# Patient Record
Sex: MALE | Race: WHITE | Employment: OTHER | URBAN - METROPOLITAN AREA
[De-identification: names, ages, dates, MRNs, and addresses within clinical notes are randomized per-mention and may not be internally consistent; named-entity substitution may affect disease eponyms.]

---

## 2018-03-08 ENCOUNTER — APPOINTMENT (OUTPATIENT)
Dept: GENERAL RADIOLOGY | Age: 78
DRG: 064 | End: 2018-03-08
Attending: EMERGENCY MEDICINE
Payer: MEDICARE

## 2018-03-08 ENCOUNTER — HOSPITAL ENCOUNTER (INPATIENT)
Age: 78
LOS: 2 days | Discharge: HOME HEALTH | DRG: 064 | End: 2018-03-10
Attending: EMERGENCY MEDICINE | Admitting: HOSPITALIST
Payer: MEDICARE

## 2018-03-08 ENCOUNTER — APPOINTMENT (OUTPATIENT)
Dept: MRI IMAGING | Age: 78
DRG: 064 | End: 2018-03-08
Attending: HOSPITALIST
Payer: MEDICARE

## 2018-03-08 ENCOUNTER — APPOINTMENT (OUTPATIENT)
Dept: CT IMAGING | Age: 78
DRG: 064 | End: 2018-03-08
Attending: EMERGENCY MEDICINE
Payer: MEDICARE

## 2018-03-08 DIAGNOSIS — I65.23 CAROTID STENOSIS, BILATERAL: ICD-10-CM

## 2018-03-08 DIAGNOSIS — I63.9 CEREBROVASCULAR ACCIDENT (CVA), UNSPECIFIED MECHANISM (HCC): Primary | ICD-10-CM

## 2018-03-08 DIAGNOSIS — J43.9 PULMONARY EMPHYSEMA, UNSPECIFIED EMPHYSEMA TYPE (HCC): ICD-10-CM

## 2018-03-08 PROBLEM — E78.00 PURE HYPERCHOLESTEROLEMIA: Status: ACTIVE | Noted: 2018-03-08

## 2018-03-08 PROBLEM — I10 ESSENTIAL HYPERTENSION: Status: ACTIVE | Noted: 2018-03-08

## 2018-03-08 LAB
ANION GAP SERPL CALC-SCNC: 4 MMOL/L (ref 7–16)
ATRIAL RATE: 78 BPM
BASOPHILS # BLD: 0 K/UL (ref 0–0.2)
BASOPHILS NFR BLD: 0 % (ref 0–2)
BUN SERPL-MCNC: 19 MG/DL (ref 8–23)
CALCIUM SERPL-MCNC: 9.2 MG/DL (ref 8.3–10.4)
CALCULATED P AXIS, ECG09: 74 DEGREES
CALCULATED R AXIS, ECG10: 85 DEGREES
CALCULATED T AXIS, ECG11: 81 DEGREES
CHLORIDE SERPL-SCNC: 103 MMOL/L (ref 98–107)
CO2 SERPL-SCNC: 32 MMOL/L (ref 21–32)
CREAT SERPL-MCNC: 1.38 MG/DL (ref 0.8–1.5)
DIAGNOSIS, 93000: NORMAL
DIFFERENTIAL METHOD BLD: ABNORMAL
EOSINOPHIL # BLD: 0.1 K/UL (ref 0–0.8)
EOSINOPHIL NFR BLD: 2 % (ref 0.5–7.8)
ERYTHROCYTE [DISTWIDTH] IN BLOOD BY AUTOMATED COUNT: 22.9 % (ref 11.9–14.6)
GLUCOSE BLD STRIP.AUTO-MCNC: 103 MG/DL (ref 65–100)
GLUCOSE SERPL-MCNC: 105 MG/DL (ref 65–100)
HCT VFR BLD AUTO: 41.5 % (ref 41.1–50.3)
HGB BLD-MCNC: 13.4 G/DL (ref 13.6–17.2)
IMM GRANULOCYTES # BLD: 0 K/UL (ref 0–0.5)
IMM GRANULOCYTES NFR BLD AUTO: 0 % (ref 0–5)
INR BLD: 1 (ref 0.9–1.2)
LYMPHOCYTES # BLD: 1 K/UL (ref 0.5–4.6)
LYMPHOCYTES NFR BLD: 15 % (ref 13–44)
MCH RBC QN AUTO: 27.8 PG (ref 26.1–32.9)
MCHC RBC AUTO-ENTMCNC: 32.3 G/DL (ref 31.4–35)
MCV RBC AUTO: 86.1 FL (ref 79.6–97.8)
MONOCYTES # BLD: 0.6 K/UL (ref 0.1–1.3)
MONOCYTES NFR BLD: 8 % (ref 4–12)
NEUTS SEG # BLD: 5.2 K/UL (ref 1.7–8.2)
NEUTS SEG NFR BLD: 75 % (ref 43–78)
P-R INTERVAL, ECG05: 134 MS
PLATELET # BLD AUTO: 242 K/UL (ref 150–450)
PMV BLD AUTO: 9.3 FL (ref 10.8–14.1)
POTASSIUM SERPL-SCNC: 4.7 MMOL/L (ref 3.5–5.1)
PT BLD: 12.6 SECS (ref 9.6–11.6)
Q-T INTERVAL, ECG07: 360 MS
QRS DURATION, ECG06: 84 MS
QTC CALCULATION (BEZET), ECG08: 410 MS
RBC # BLD AUTO: 4.82 M/UL (ref 4.23–5.67)
SODIUM SERPL-SCNC: 139 MMOL/L (ref 136–145)
TSH SERPL DL<=0.005 MIU/L-ACNC: 1.07 UIU/ML (ref 0.36–3.74)
VENTRICULAR RATE, ECG03: 78 BPM
WBC # BLD AUTO: 6.9 K/UL (ref 4.3–11.1)

## 2018-03-08 PROCEDURE — 94762 N-INVAS EAR/PLS OXIMTRY CONT: CPT | Performed by: EMERGENCY MEDICINE

## 2018-03-08 PROCEDURE — 82962 GLUCOSE BLOOD TEST: CPT

## 2018-03-08 PROCEDURE — 71045 X-RAY EXAM CHEST 1 VIEW: CPT

## 2018-03-08 PROCEDURE — 74011000250 HC RX REV CODE- 250: Performed by: HOSPITALIST

## 2018-03-08 PROCEDURE — 94760 N-INVAS EAR/PLS OXIMETRY 1: CPT

## 2018-03-08 PROCEDURE — 74011000302 HC RX REV CODE- 302: Performed by: HOSPITALIST

## 2018-03-08 PROCEDURE — 74011000250 HC RX REV CODE- 250: Performed by: EMERGENCY MEDICINE

## 2018-03-08 PROCEDURE — 70551 MRI BRAIN STEM W/O DYE: CPT

## 2018-03-08 PROCEDURE — 84443 ASSAY THYROID STIM HORMONE: CPT | Performed by: HOSPITALIST

## 2018-03-08 PROCEDURE — 74011250636 HC RX REV CODE- 250/636: Performed by: HOSPITALIST

## 2018-03-08 PROCEDURE — 80048 BASIC METABOLIC PNL TOTAL CA: CPT | Performed by: EMERGENCY MEDICINE

## 2018-03-08 PROCEDURE — 94640 AIRWAY INHALATION TREATMENT: CPT

## 2018-03-08 PROCEDURE — 65660000000 HC RM CCU STEPDOWN

## 2018-03-08 PROCEDURE — 93005 ELECTROCARDIOGRAM TRACING: CPT | Performed by: EMERGENCY MEDICINE

## 2018-03-08 PROCEDURE — 74011250637 HC RX REV CODE- 250/637: Performed by: EMERGENCY MEDICINE

## 2018-03-08 PROCEDURE — 74011636320 HC RX REV CODE- 636/320: Performed by: EMERGENCY MEDICINE

## 2018-03-08 PROCEDURE — 85610 PROTHROMBIN TIME: CPT

## 2018-03-08 PROCEDURE — 74011250636 HC RX REV CODE- 250/636: Performed by: EMERGENCY MEDICINE

## 2018-03-08 PROCEDURE — 74011000258 HC RX REV CODE- 258: Performed by: EMERGENCY MEDICINE

## 2018-03-08 PROCEDURE — 70450 CT HEAD/BRAIN W/O DYE: CPT

## 2018-03-08 PROCEDURE — 74011250637 HC RX REV CODE- 250/637: Performed by: HOSPITALIST

## 2018-03-08 PROCEDURE — 92610 EVALUATE SWALLOWING FUNCTION: CPT

## 2018-03-08 PROCEDURE — 86580 TB INTRADERMAL TEST: CPT | Performed by: HOSPITALIST

## 2018-03-08 PROCEDURE — 70498 CT ANGIOGRAPHY NECK: CPT

## 2018-03-08 PROCEDURE — 85025 COMPLETE CBC W/AUTO DIFF WBC: CPT | Performed by: EMERGENCY MEDICINE

## 2018-03-08 PROCEDURE — 99285 EMERGENCY DEPT VISIT HI MDM: CPT | Performed by: EMERGENCY MEDICINE

## 2018-03-08 RX ORDER — GUAIFENESIN 100 MG/5ML
324 LIQUID (ML) ORAL
Status: DISCONTINUED | OUTPATIENT
Start: 2018-03-08 | End: 2018-03-08

## 2018-03-08 RX ORDER — SODIUM CHLORIDE 9 MG/ML
100 INJECTION, SOLUTION INTRAVENOUS CONTINUOUS
Status: DISPENSED | OUTPATIENT
Start: 2018-03-08 | End: 2018-03-09

## 2018-03-08 RX ORDER — PRAVASTATIN SODIUM 20 MG/1
40 TABLET ORAL
Status: DISCONTINUED | OUTPATIENT
Start: 2018-03-08 | End: 2018-03-08 | Stop reason: DRUGHIGH

## 2018-03-08 RX ORDER — LABETALOL HYDROCHLORIDE 5 MG/ML
5 INJECTION, SOLUTION INTRAVENOUS
Status: DISCONTINUED | OUTPATIENT
Start: 2018-03-08 | End: 2018-03-10 | Stop reason: HOSPADM

## 2018-03-08 RX ORDER — GUAIFENESIN 100 MG/5ML
324 LIQUID (ML) ORAL
Status: COMPLETED | OUTPATIENT
Start: 2018-03-08 | End: 2018-03-08

## 2018-03-08 RX ORDER — ASPIRIN 300 MG/1
300 SUPPOSITORY RECTAL ONCE
Status: DISCONTINUED | OUTPATIENT
Start: 2018-03-08 | End: 2018-03-08

## 2018-03-08 RX ORDER — FACIAL-BODY WIPES
10 EACH TOPICAL DAILY PRN
Status: DISCONTINUED | OUTPATIENT
Start: 2018-03-08 | End: 2018-03-10 | Stop reason: HOSPADM

## 2018-03-08 RX ORDER — ALBUTEROL SULFATE 0.83 MG/ML
2.5 SOLUTION RESPIRATORY (INHALATION)
Status: DISCONTINUED | OUTPATIENT
Start: 2018-03-08 | End: 2018-03-10 | Stop reason: HOSPADM

## 2018-03-08 RX ORDER — SODIUM CHLORIDE 0.9 % (FLUSH) 0.9 %
5-10 SYRINGE (ML) INJECTION EVERY 8 HOURS
Status: DISCONTINUED | OUTPATIENT
Start: 2018-03-08 | End: 2018-03-10 | Stop reason: HOSPADM

## 2018-03-08 RX ORDER — ENOXAPARIN SODIUM 100 MG/ML
40 INJECTION SUBCUTANEOUS EVERY 24 HOURS
Status: DISCONTINUED | OUTPATIENT
Start: 2018-03-08 | End: 2018-03-08

## 2018-03-08 RX ORDER — BUDESONIDE 0.5 MG/2ML
500 INHALANT ORAL
Status: DISCONTINUED | OUTPATIENT
Start: 2018-03-08 | End: 2018-03-10 | Stop reason: HOSPADM

## 2018-03-08 RX ORDER — OMEPRAZOLE 40 MG/1
40 CAPSULE, DELAYED RELEASE ORAL DAILY
COMMUNITY

## 2018-03-08 RX ORDER — SODIUM CHLORIDE 0.9 % (FLUSH) 0.9 %
5-10 SYRINGE (ML) INJECTION AS NEEDED
Status: DISCONTINUED | OUTPATIENT
Start: 2018-03-08 | End: 2018-03-10 | Stop reason: HOSPADM

## 2018-03-08 RX ORDER — ARFORMOTEROL TARTRATE 15 UG/2ML
15 SOLUTION RESPIRATORY (INHALATION)
Status: DISCONTINUED | OUTPATIENT
Start: 2018-03-08 | End: 2018-03-10 | Stop reason: HOSPADM

## 2018-03-08 RX ORDER — SODIUM CHLORIDE 0.9 % (FLUSH) 0.9 %
10 SYRINGE (ML) INJECTION
Status: COMPLETED | OUTPATIENT
Start: 2018-03-08 | End: 2018-03-08

## 2018-03-08 RX ORDER — ACETAMINOPHEN 325 MG/1
650 TABLET ORAL
Status: DISCONTINUED | OUTPATIENT
Start: 2018-03-08 | End: 2018-03-10 | Stop reason: HOSPADM

## 2018-03-08 RX ORDER — IPRATROPIUM BROMIDE AND ALBUTEROL SULFATE 2.5; .5 MG/3ML; MG/3ML
3 SOLUTION RESPIRATORY (INHALATION)
Status: COMPLETED | OUTPATIENT
Start: 2018-03-08 | End: 2018-03-08

## 2018-03-08 RX ORDER — LANOLIN ALCOHOL/MO/W.PET/CERES
325 CREAM (GRAM) TOPICAL DAILY
COMMUNITY

## 2018-03-08 RX ORDER — CLOPIDOGREL BISULFATE 75 MG/1
75 TABLET ORAL DAILY
Status: DISCONTINUED | OUTPATIENT
Start: 2018-03-09 | End: 2018-03-10

## 2018-03-08 RX ORDER — ENOXAPARIN SODIUM 100 MG/ML
30 INJECTION SUBCUTANEOUS EVERY 24 HOURS
Status: DISCONTINUED | OUTPATIENT
Start: 2018-03-08 | End: 2018-03-10

## 2018-03-08 RX ORDER — PRAVASTATIN SODIUM 80 MG/1
80 TABLET ORAL
Status: DISCONTINUED | OUTPATIENT
Start: 2018-03-08 | End: 2018-03-10 | Stop reason: HOSPADM

## 2018-03-08 RX ORDER — DILTIAZEM HYDROCHLORIDE 120 MG/1
120 CAPSULE, COATED, EXTENDED RELEASE ORAL DAILY
Status: DISCONTINUED | OUTPATIENT
Start: 2018-03-09 | End: 2018-03-10 | Stop reason: HOSPADM

## 2018-03-08 RX ADMIN — SODIUM CHLORIDE 100 ML/HR: 900 INJECTION, SOLUTION INTRAVENOUS at 17:06

## 2018-03-08 RX ADMIN — PRAVASTATIN SODIUM 80 MG: 80 TABLET ORAL at 21:17

## 2018-03-08 RX ADMIN — TUBERCULIN PURIFIED PROTEIN DERIVATIVE 5 UNITS: 5 INJECTION, SOLUTION INTRADERMAL at 21:30

## 2018-03-08 RX ADMIN — IPRATROPIUM BROMIDE AND ALBUTEROL SULFATE 3 ML: .5; 3 SOLUTION RESPIRATORY (INHALATION) at 13:22

## 2018-03-08 RX ADMIN — ENOXAPARIN SODIUM 40 MG: 40 INJECTION SUBCUTANEOUS at 17:05

## 2018-03-08 RX ADMIN — Medication 5 ML: at 21:18

## 2018-03-08 RX ADMIN — ASPIRIN 81 MG 324 MG: 81 TABLET ORAL at 14:45

## 2018-03-08 RX ADMIN — ARFORMOTEROL TARTRATE 15 MCG: 15 SOLUTION RESPIRATORY (INHALATION) at 22:04

## 2018-03-08 RX ADMIN — Medication 10 ML: at 12:57

## 2018-03-08 RX ADMIN — SODIUM CHLORIDE 100 ML: 900 INJECTION, SOLUTION INTRAVENOUS at 12:57

## 2018-03-08 RX ADMIN — IOPAMIDOL: 755 INJECTION, SOLUTION INTRAVENOUS at 12:57

## 2018-03-08 RX ADMIN — BUDESONIDE 500 MCG: 0.5 INHALANT RESPIRATORY (INHALATION) at 22:04

## 2018-03-08 NOTE — ED NOTES
Aspirin oral changed to ASA suppository as pt failed bedside swallow and will need evaluate from speech.

## 2018-03-08 NOTE — IP AVS SNAPSHOT
[facility] 
 
 
 [address] 
[phone] Patient: Jaja Henry MRN: [id number] AFS:8/9/[birth year]00 About your hospitalization You were admitted on:  March 8, 2018 You last received care in the:  [facility] 7 MED SURG You were discharged on:  March 10, 2018 Why you were hospitalized Your primary diagnosis was:  Cva (Cerebral Vascular Accident) (Hcc) Your diagnoses also included:  Carotid Stenosis, Bilateral, Essential Hypertension, Pure Hypercholesterolemia, Pulmonary Emphysema (Hcc), Aspiration Pneumonitis (Hcc), Paroxysmal Atrial Tachycardia (Hcc) Follow-up Information Follow up With Details Comments Contact Info Karlos De La Rosa MD In 3 days  [address] [address] 
[phone] Discharge Orders None A check dominguez indicates which time of day the medication should be taken. My Medications START taking these medications Instructions Each Dose to Equal  
 Morning Noon Evening Bedtime  
 apixaban 5 mg tablet Commonly known as:  Ottoniel Becket Take 1 Tab by mouth two (2) times a day for 30 days. 5 mg  
    
  
   
   
   
  
  
 fluticasone-salmeterol 250-50 mcg/dose diskus inhaler Commonly known as:  ADVAIR Take 1 Puff by inhalation two (2) times a day. 1 Puff  
    
  
   
   
  
   
  
 tiotropium 18 mcg inhalation capsule Commonly known as:  Kaylyn Ear Start taking on:  3/11/2018 Take 1 Cap by inhalation daily. 1 Cap CONTINUE taking these medications Instructions Each Dose to Equal  
 Morning Noon Evening Bedtime  
 dilTIAZem  mg ER capsule Commonly known as:  CARDIZEM CD Take 1 Cap by mouth daily. 120 mg  
    
  
   
   
   
  
 ferrous sulfate 325 mg (65 mg iron) tablet Take 325 mg by mouth daily. 325 mg  
    
  
   
   
   
  
 omeprazole 40 mg capsule Commonly known as:  PRILOSEC Take 40 mg by mouth daily. 40 mg  
    
  
   
   
   
  
 pravastatin 40 mg tablet Commonly known as:  PRAVACHOL Take 1 Tab by mouth nightly. 40 mg  
    
   
   
   
  
  
  
STOP taking these medications   
 aspirin 81 mg chewable tablet Where to Get Your Medications Information on where to get these meds will be given to you by the nurse or doctor. ! Ask your nurse or doctor about these medications  
  apixaban 5 mg tablet  
 fluticasone-salmeterol 250-50 mcg/dose diskus inhaler  
 tiotropium 18 mcg inhalation capsule Discharge Instructions NUTRITION Continue Oral Nutrition Supplement (ONS) at discharge. Recommend Ensure Enlive or a comparable/similar product Three times daily for 30 days unless otherwise directed by your Primary Care Physician. Kalani Bonds RD, LD Stroke: After Your Visit Your Care Instructions You have had a stroke. Risk factors for stroke include being overweight, smoking, and sedentary lifestyle. This means that the blood flow to a part of your brain was blocked for some time, which damages the nerve cells in that part of the brain. The part of your body controlled by that part of your brain may not function properly now. The brain is an amazing organ that can heal itself to some degree. The stroke you had damaged part of your brain, but other parts of your brain may take over in some way for the damaged areas. You have already started this process. Going home may be hard for you and your family. The more you can try to do for yourself, the better. Remember to take each day one at a time. Follow-up care is a key part of your treatment and safety. Be sure to make and go to all appointments, and call your doctor if you are having problems. Its also a good idea to know your test results and keep a list of the medicines you take. How can you care for yourself at home? Enter a stroke rehabilitation (rehab) program, if your doctor recommends it. Physical, speech, and occupational therapies can help you manage bathing, dressing, eating, and other basics of daily living. Eat a heart-healthy diet that is low in cholesterol, saturated fat, and salt. Eat lots of fresh fruits and vegetables and foods high in fiber. Increase your activities slowly. Take short rest breaks when you get tired. Gradually increase the amount you walk. Start out by walking a little more than you did the day before. Do not drive until your doctor says it is okay. It is normal to feel sad or depressed after a stroke. If the blues last, talk to your doctor. If you are having problems with urine leakage, go to the bathroom at regular times, including when you first wake up and at bedtime. Also, limit fluids after dinner. If you are constipated, drink plenty of fluids, enough so that your urine is light yellow or clear like water. If you have kidney, heart, or liver disease and have to limit fluids, talk with your doctor before you increase the amount of fluids you drink. Set up a regular time for using the toilet. If you continue to have constipation, your doctor may suggest using a bulking agent, such as Metamucil, or a stool softener, laxative, or enema. Medicines Take your medicines exactly as prescribed. Call your doctor if you think you are having a problem with your medicine. You may be taking several medicines. ACE (angiotensin-converting enzyme) inhibitors, angiotensin II receptor blockers (ARBs), beta-blockers, diuretics (water pills), and calcium channel blockers control your blood pressure. Statins help lower cholesterol. Your doctor may also prescribe medicines for depression, pain, sleep problems, anxiety, or agitation.  
If your doctor has given you medicine that prevents blood clots, such as warfarin (Coumadin), aspirin combined with extended-release dipyridamole (Aggrenox), clopidogrel (Plavix), or aspirin to prevent another stroke, you should: 
Tell your dentist, pharmacist, and other health professionals that you take these medicines. Watch for unusual bruising or bleeding, such as blood in your urine, red or black stools, or bleeding from your nose or gums. Get regular blood tests to check your clotting time if you are taking Coumadin. Wear medical alert jewelry that says you take blood thinners. You can buy this at most [employer]. Do not take any over-the-counter medicines or herbal products without talking to your doctor first. 
If you take birth control pills or hormone replacement therapy, talk to your doctor about whether they are right for you. For family members and caregivers Make the home safe. Set up a room so that your loved one does not have to climb stairs. Be sure the bathroom is on the same floor. Move throw rugs and furniture that could cause falls, and make sure that the lighting is good. Put grab bars and seats in tubs and showers. Find out what your loved one can do and what he or she needs help with. Try not to do things for your loved one that your loved one can do on his or her own. Help him or her learn and practice new skills. Visit and talk with your loved one often. Try doing activities together that you both enjoy, such as playing cards or board games. Keep in touch with your loved one's friends as much as you can, and encourage them to visit. Take care of yourself. Do not try to do everything yourself. Ask other family members to help. Eat well, get enough rest, and take time to do things that you enjoy. Keep up with your own doctor visits, and make sure to take your medicines regularly. Get out of the house as much as you can. Join a local support group. Find out if you qualify for home health care visits to help with rehab or for adult day care. When should you call for help? Call 911 anytime you think you may need emergency care. For example, call if: 
You have signs of another stroke. These may include: 
Sudden numbness, paralysis, or weakness in your face, arm, or leg, especially on only one side of your body. New problems with walking or balance. Sudden vision changes. Drooling or slurred speech. New problems speaking or understanding simple statements, or you feel confused. A sudden, severe headache that is different from past headaches. Call 911 even if these symptoms go away in a few minutes. You cough up blood. You vomit blood or what looks like coffee grounds. You pass maroon or very bloody stools. Call your doctor now or seek immediate medical care if: 
You have new bruises or blood spots under your skin. You have a nosebleed. Your gums bleed when you brush your teeth. You have blood in your urine. Your stools are black and tarlike or have streaks of blood. You have vaginal bleeding when you are not having your period, or heavy period bleeding. You have new symptoms that may be related to your stroke, such as falls or trouble swallowing. Watch closely for changes in your health, and be sure to contact your doctor if you have any problems. Where can you learn more? Go to [employer].be Enter D959  in the search box to learn more about \"Stroke: After Your Visit\". © 4843-7844 Healthwise, Incorporated. Care instructions adapted under license by [facility] (which disclaims liability or warranty for this information). This care instruction is for use with your licensed healthcare professional. If you have questions about a medical condition or this instruction, always ask your healthcare professional. Oleg Fernando any warranty or liability for your use of this information. DISCHARGE SUMMARY from Nurse PATIENT INSTRUCTIONS: 
 
 After general anesthesia or intravenous sedation, for 24 hours or while taking prescription Narcotics: · Limit your activities · Do not drive and operate hazardous machinery · Do not make important personal or business decisions · Do  not drink alcoholic beverages · If you have not urinated within 8 hours after discharge, please contact your surgeon on call. Report the following to your surgeon: 
· Excessive pain, swelling, redness or odor of or around the surgical area · Temperature over 100.5 · Nausea and vomiting lasting longer than 4 hours or if unable to take medications · Any signs of decreased circulation or nerve impairment to extremity: change in color, persistent  numbness, tingling, coldness or increase pain · Any questions What to do at Home: 
Recommended activity: Activity as tolerated, per MD instructions If you experience any of the following symptoms fever > 100.5, nausea, vomiting, pain, chest pain and/or shortness of breath please follow up with MD. 
 
*  Please give a list of your current medications to your Primary Care Provider. *  Please update this list whenever your medications are discontinued, doses are 
    changed, or new medications (including over-the-counter products) are added. *  Please carry medication information at all times in case of emergency situations. These are general instructions for a healthy lifestyle: No smoking/ No tobacco products/ Avoid exposure to second hand smoke Surgeon General's Warning:  Quitting smoking now greatly reduces serious risk to your health. Obesity, smoking, and sedentary lifestyle greatly increases your risk for illness A healthy diet, regular physical exercise & weight monitoring are important for maintaining a healthy lifestyle You may be retaining fluid if you have a history of heart failure or if you experience any of the following symptoms:  Weight gain of 3 pounds or more overnight or 5 pounds in a week, increased swelling in our hands or feet or shortness of breath while lying flat in bed. Please call your doctor as soon as you notice any of these symptoms; do not wait until your next office visit. Recognize signs and symptoms of STROKE: 
 
F-face looks uneven A-arms unable to move or move unevenly S-speech slurred or non-existent T-time-call 911 as soon as signs and symptoms begin-DO NOT go Back to bed or wait to see if you get better-TIME IS BRAIN. Warning Signs of HEART ATTACK Call 911 if you have these symptoms: 
? Chest discomfort. Most heart attacks involve discomfort in the center of the chest that lasts more than a few minutes, or that goes away and comes back. It can feel like uncomfortable pressure, squeezing, fullness, or pain. ? Discomfort in other areas of the upper body. Symptoms can include pain or discomfort in one or both arms, the back, neck, jaw, or stomach. ? Shortness of breath with or without chest discomfort. ? Other signs may include breaking out in a cold sweat, nausea, or lightheadedness. Don't wait more than five minutes to call [address]! Fast action can save your life. Calling 911 is almost always the fastest way to get lifesaving treatment. Emergency Medical Services staff can begin treatment when they arrive  up to an hour sooner than if someone gets to the hospital by car. The discharge information has been reviewed with the patient. The patient verbalized understanding. Discharge medications reviewed with the patient and appropriate educational materials and side effects teaching were provided. ___________________________________________________________________________________________________________________________________ MyChart Announcement  We are excited to announce that we are making your provider's discharge notes available to you in [employer]. You will see these notes when they are completed and signed by the physician that discharged you from your recent hospital stay. If you have any questions or concerns about any information you see in [employer], please call the Health Information Department where you were seen or reach out to your Primary Care Provider for more information about your plan of care. Introducing [facility]! [facility] [facility] introduces [employer] patient portal. Now you can access parts of your medical record, email your doctor's office, and request medication refills online. 1. In your internet browser, go to [email or web address] 2. Click on the First Time User? Click Here link in the Sign In box. You will see the New Member Sign Up page. 3. Enter your [employer] Access Code exactly as it appears below. You will not need to use this code after youve completed the sign-up process. If you do not sign up before the expiration date, you must request a new code. · [employer] Access Code: [email or web address] Expires: 6/6/2018  2:26 PM 
 
4. Enter the last four digits of your Social Security Number (xxxx) and Date of Birth (mm/dd/yyyy) as indicated and click Submit. You will be taken to the next sign-up page. 5. Create a [employer] ID. This will be your [employer] login ID and cannot be changed, so think of one that is secure and easy to remember. 6. Create a [employer] password. You can change your password at any time. 7. Enter your Password Reset Question and Answer. This can be used at a later time if you forget your password. 8. Enter your e-mail address. You will receive e-mail notification when new information is available in [address] 9. Click Sign Up. You can now view and download portions of your medical record. 10. Click the Download Summary menu link to download a portable copy of your medical information. If you have questions, please visit the Frequently Asked Questions section of the [employer] website. Remember, [employer] is NOT to be used for urgent needs. For medical emergencies, dial 911. Now available from your iPhone and Android! Providers Seen During Your Hospitalization Provider Specialty Primary office phone Ramirez Curtis MD Emergency Medicine [phone] Ella Olson MD Internal Medicine [phone] Immunizations Administered for This Admission Name Date Influenza Vaccine (Quad) PF  Deferred () TB Skin Test (PPD) Intradermal  Deferred (), 3/8/2018 Your Primary Care Physician (PCP) Primary Care Physician Office Phone Office Fax Honorio Evans  You are allergic to the following No active allergies Recent Documentation Weight BMI Smoking Status 47.2 kg 15.81 kg/m2 Former Smoker Emergency Contacts Name Discharge Info Relation Home Work Mobile René Garsia [22] [phone] Patient Belongings The following personal items are in your possession at time of discharge: 
  Dental Appliances: Uppers, With patient  Visual Aid: Glasses      Home Medications: None   Jewelry: None  Clothing: Pants, Shirt, Undergarments, With patient    Other Valuables: Wallet  Personal Items Sent to Safe: none Please provide this summary of care documentation to your next provider. Signatures-by signing, you are acknowledging that this After Visit Summary has been reviewed with you and you have received a copy. Patient Signature:  ____________________________________________________________ Date:  ____________________________________________________________  
  
Marisa Villavicencio Provider Signature:  ____________________________________________________________ Date:  ____________________________________________________________

## 2018-03-08 NOTE — ED TRIAGE NOTES
Pt arrives per EMS from home. Upon waking this AM found to be having left sided weakness and numbness.  Stroke called on arrival .

## 2018-03-08 NOTE — PROGRESS NOTES
TRANSFER - IN REPORT:    Verbal report received from Jv Davis RN on Bib Hartman  being received from ER for routine progression of care      Report consisted of patients Situation, Background, Assessment and   Recommendations(SBAR). Information from the following report(s) SBAR, ED Summary, Intake/Output, MAR, Accordion and Recent Results was reviewed with the receiving nurse. Opportunity for questions and clarification was provided. Assessment completed upon patients arrival to unit and care assumed.

## 2018-03-08 NOTE — ED PROVIDER NOTES
HPI Comments: 59-year-old male went to bed around midnight last night. He woke up at 10 AM to use the bathroom and was falling to the left. He denies getting up throughout the night. He was found by his son in the bathroom and required assistance to get up. Upon EMS arrival, he had left-sided weakness. He also reports left sided numbness. No history of stroke. Denies headache. Also reports pain in his left eye. Has history of diabetes, hypertension, COPD. Patient is a 68 y.o. male presenting with weakness. The history is provided by the patient. Extremity Weakness    Associated symptoms include numbness. Pertinent negatives include no back pain. Past Medical History:   Diagnosis Date    Chronic obstructive pulmonary disease ([facility] [address]     Hypertension        History reviewed. No pertinent surgical history. History reviewed. No pertinent family history. Social History     Social History    Marital status:      Spouse name: N/A    Number of children: N/A    Years of education: N/A     Occupational History    Not on file. Social History Main Topics    Smoking status: Former Smoker    Smokeless tobacco: Not on file    Alcohol use No    Drug use: No    Sexual activity: Not on file     Other Topics Concern    Not on file     Social History Narrative         ALLERGIES: Review of patient's allergies indicates no known allergies. Review of Systems   Constitutional: Negative for chills and fever. HENT: Negative for hearing loss. Eyes: Negative for visual disturbance. Respiratory: Positive for shortness of breath. Negative for cough. Cardiovascular: Negative for chest pain and palpitations. Gastrointestinal: Negative for abdominal pain, diarrhea, nausea and vomiting. Musculoskeletal: Negative for back pain. Skin: Negative for rash. Neurological: Positive for weakness and numbness. Negative for headaches. Psychiatric/Behavioral: Negative for confusion. Vitals:    03/08/18 1211 03/08/18 1214 03/08/18 1217   BP: 155/88  154/83   Pulse: 89  83   Resp: 20  26   Temp: 97.7 °F (36.5 °C)     SpO2: (!) 89% 97% 97%   Weight: 47.2 kg (104 lb)              Physical Exam   Constitutional: He appears well-developed and well-nourished. HENT:   Head: Normocephalic and atraumatic. Right Ear: External ear normal.   Left Ear: External ear normal.   Nose: Nose normal.   Mouth/Throat: Oropharynx is clear and moist.   Eyes: Conjunctivae are normal. Pupils are equal, round, and reactive to light. Neck: Normal range of motion. Neck supple. Cardiovascular: Regular rhythm and intact distal pulses. Exam reveals distant heart sounds. Pulmonary/Chest: Effort normal and breath sounds normal. Tachypnea noted. No respiratory distress. He has no wheezes. Abdominal: Soft. Bowel sounds are normal. He exhibits no distension. There is no tenderness. Musculoskeletal: Normal range of motion. He exhibits no edema. Neurological: He is alert. A sensory deficit is present. He exhibits abnormal muscle tone. Gait abnormal.   Decreased sensation left face, left arm, left leg. Slight weakness left arm and leg. (4/5) No obvious facial droop. Slight tongue deviation to the left   Skin: Skin is warm and dry. Psychiatric: Judgment normal.   Nursing note and vitals reviewed. MDM  Number of Diagnoses or Management Options  Cerebrovascular accident (CVA), unspecified mechanism [facility]):   Diagnosis management comments: Parts of this document were created using dragon voice recognition software. The chart has been reviewed but errors may still be present. Wake up Stroke with normal initial head CT. Evaluated by tele neurology. We'll obtain CTA and admission for MRI.    2:24 PM  Carotid stenosis on CTA, otherwise unremarkable. Discussed with hospitalist for admission. Patient updated.        Amount and/or Complexity of Data Reviewed  Clinical lab tests: ordered and reviewed (Results for orders placed or performed during the hospital encounter of 03/08/18  -CBC WITH AUTOMATED DIFF       Result                                            Value                         Ref Range                       WBC                                               6.9                           4.3 - 11.1 K/uL                 RBC                                               4.82                          4.23 - 5.67 M/uL                HGB                                               13.4 (L)                      13.6 - 17.2 g/dL                HCT                                               41.5                          41.1 - 50.3 %                   MCV                                               86.1                          79.6 - 97.8 FL                  MCH                                               27.8                          26.1 - 32.9 PG                  MCHC                                              32.3                          31.4 - 35.0 g/dL                RDW                                               22.9 (H)                      11.9 - 14.6 %                   PLATELET                                          242                           150 - 450 K/uL                  MPV                                               9.3 (L)                       10.8 - 14.1 FL                  DF                                                AUTOMATED                                                     NEUTROPHILS                                       75                            43 - 78 %                       LYMPHOCYTES                                       15                            13 - 44 %                       MONOCYTES                                         8                             4.0 - 12.0 %                    EOSINOPHILS                                       2                             0.5 - 7.8 %                     BASOPHILS 0                             0.0 - 2.0 %                     IMMATURE GRANULOCYTES                             0                             0.0 - 5.0 %                     ABS. NEUTROPHILS                                  5.2                           1.7 - 8.2 K/UL                  ABS. LYMPHOCYTES                                  1.0                           0.5 - 4.6 K/UL                  ABS. MONOCYTES                                    0.6                           0.1 - 1.3 K/UL                  ABS. EOSINOPHILS                                  0.1                           0.0 - 0.8 K/UL                  ABS. BASOPHILS                                    0.0                           0.0 - 0.2 K/UL                  ABS. IMM.  GRANS.                                  0.0                           0.0 - 0.5 K/UL             -METABOLIC PANEL, BASIC       Result                                            Value                         Ref Range                       Sodium                                            139                           136 - 145 mmol/L                Potassium                                         4.7                           3.5 - 5.1 mmol/L                Chloride                                          103                           98 - 107 mmol/L                 CO2                                               32                            21 - 32 mmol/L                  Anion gap                                         4 (L)                         7 - 16 mmol/L                   Glucose                                           105 (H)                       65 - 100 mg/dL                  BUN                                               19                            8 - 23 MG/DL                    Creatinine                                        1.38                          0.8 - 1.5 MG/DL                 GFR est AA                                        >60 >60 ml/min/1.73m2               GFR est non-AA                                    53 (L)                        >60 ml/min/1.73m2               Calcium                                           9.2                           8.3 - 10.4 MG/DL           -POC PT/INR       Result                                            Value                         Ref Range                       Prothrombin time (POC)                            12.6 (H)                      9.6 - 11.6 SECS                 INR (POC)                                         1.0                           0.9 - 1.2                  -GLUCOSE, POC       Result                                            Value                         Ref Range                       Glucose (POC)                                     103 (H)                       65 - 100 mg/dL             -EKG, 12 LEAD, INITIAL       Result                                            Value                         Ref Range                       Ventricular Rate                                  78                            BPM                             Atrial Rate                                       78                            BPM                             P-R Interval                                      134                           ms                              QRS Duration                                      84                            ms                              Q-T Interval                                      360                           ms                              QTC Calculation (Bezet)                           410                           ms                              Calculated P Axis                                 74                            degrees                         Calculated R Axis                                 85                            degrees                         Calculated T Axis                                 81 degrees                         Diagnosis                                                                                                   !! AGE AND GENDER SPECIFIC ECG ANALYSIS !! Normal sinus rhythm   Septal infarct , age undetermined   T wave abnormality, consider anterior ischemia   Abnormal ECG   When compared with ECG of 20-JUN-2016 16:38,   Premature supraventricular complexes are no longer Present   T wave inversion now evident in Anterior leads   Confirmed by BRADLEY EDOUARD (), Arleen Barrientos ([id number]) on 3/8/2018 2:06:06 PM     )  Tests in the radiology section of CPT®: ordered and reviewed (Ct Head Wo Cont    Result Date: 3/8/2018  CT HEAD WITHOUT CONTRAST, 3/8/2018 History: Left-sided weakness. Comparison: CT head without contrast 6/20/2016 Technique:   5 mm axial scans from the skull base to the vertex. All CT scans performed at this facility use one or all of the following: Automated exposure control, adjustment of the mA and/or kVp according to patient's size, iterative reconstruction. Findings:  No evidence of intracranial hemorrhage is seen. No abnormal extra-axial fluid collections are seen. Stable mild to moderate cortical involutional changes are seen. No evidence for acute hydrocephalus is seen. No evidence of midline shift or herniation is seen. No abnormal edema pattern is seen in a vascular distribution to suggest large artery infarction. Only stable chronic periventricular white matter hypoattenuation is seen. Evaluation with bone windows shows no acute osseous changes. The visualized sinuses, middle ears, and mastoid air cells are well aerated. IMPRESSION:  1. No acute intracranial process evident by noncontrast CT study of the head. Cta Head Neck W Wo Cont    Result Date: 3/8/2018  History: Code stroke.  Left sided weakness Comments: CT ANGIOGRAM OF THE NECK AND CT ANGIOGRAM OF THE [place] was obtained following the administration of IV contrast. IV contrast was administered to evaluate the arterial vasculature. Reformatted images in the coronal and sagittal planes as well as 3-D imaging was obtained and reviewed on a dedicated PACS and [address]. Radiation reduction dose techniques were used for the study. Our CT scanner use one or all of the following- Automated exposure control, adjustment of the mA and/or KV according the patient size, iterative reconstruction. Findings: CT ANGIOGRAM OF THE NECK: Please note there is motion artifact on this exam which does degrade the study to interpret. The arch and proximal great vessels demonstrate generalized atherosclerotic changes. 45-50% narrowing is noted at the origin left subclavian artery. Motion artifact is fairly significant at the level of the carotid bulbs. The right carotid bulb demonstrates eccentric calcified plaque disease with a proximally 50% narrowing. The left carotid bulb demonstrates narrowing that extends into the origin of the left internal carotid artery. Degree of stenosis is 65%. These are somewhat approximated due to motion artifact. The vertebral arteries are patent. All measurements are based upon NASCET criteria. Severe emphysematous changes are noted in the lung apices. The thyroid gland is grossly unremarkable. CT ANGIOGRAM OF [place]: The petrous, cavernous, and supraclinoid internal carotid arteries are patent with generalized atherosclerotic changes. 50% narrowing is noted in the cavernous portion of the left internal carotid artery. The anterior and middle cerebral arteries are patent bilaterally. The distal vertebral arteries, basilar artery, posterior cerebral arteries are patent bilaterally. No evidence of aneurysm and/or vascular malformation. The orbits and paranasal sinuses are grossly unremarkable with the exception of very mild disease in the right maxillary sinus. The ventricles are symmetric in size and configuration. The sulci are intact.      IMPRESSION: 1. Please note the examination is limited due to motion artifact. The most significant vascular pathology is at the level of the left carotid bulb and origin of the left internal carotid artery with a exceed 65% stenosis 2. Significant emphysematous changes in the lung apices    Xr Chest Port    Result Date: 3/8/2018  CHEST X-RAY, single portable view  3/8/2018 History: CVA. Technique: Single frontal view of the chest. Comparison: Chest x-ray 6/20/2016 Findings: The cardiac silhouette is normal in respect to size. The lungs are expanded without evidence for pneumothorax. Stable asymmetric lucency is seen in the upper lung field felt to represent chronic bullous emphysematous changes. No consolidative airspace process or pleural effusion is seen. Stable chronic appearing interstitial prominence is seen in the mid and lower lung fields. IMPRESSION: 1.   No acute cardiopulmonary process evident on single frontal view of the chest.     )  Tests in the medicine section of CPT®: ordered and reviewed          ED Course       Procedures

## 2018-03-08 NOTE — PROGRESS NOTES
Primary Nurse Jacquie Guerrier RN and Cami Fung RN performed a dual skin assessment on this patient No impairment noted

## 2018-03-08 NOTE — PROGRESS NOTES
STG: Pt will tolerate regular textures/thin liquids without overt signs/sx of aspiration with 100% accuracy  STG: Pt will complete oral motor exercises x10 with 90% accuracy  STG: Pt will participate with full speech/language assessment x1  LTG: Pt will tolerate the least restrictive diet at discharge without respiratory compromise    Speech language pathology: bedside swallow note: Initial Assessment    NAME/AGE/GENDER: José Miguel Velez is a 68 y.o. male  DATE: 3/8/2018  PRIMARY DIAGNOSIS: CVA (cerebral vascular accident) [facility])       ICD-10: Treatment Diagnosis: dysphagia; oropharyngeal R13.12  INTERDISCIPLINARY COLLABORATION: Registered Nurse  PRECAUTIONS/ALLERGIES: Review of patient's allergies indicates no known allergies. ASSESSMENT:Based on the objective data described below, Mr. Dawna House presents with no overt signs/sx of aspiration. Patient is [place] with repetitions required. Mild left facial droop and lingual deviation to the left side. He has upper dentition only. Speech is clear and patient is fully oriented. No overt signs/sx of aspiration with thin liquids, mixed trials, and regular textures. Only minimal increased time for mastication required with complete oral clearance. Per RN, he tolerated an aspirin with a liquid wash earlier today without issue. The patient lives at home with his son. He is typically independent. Reports that he does the cooking and manages his own medications at home. Recommend regular diet/thin liquids. Will follow for oral motor exercises and full cognitive-linguistic assessment. Patient will benefit from skilled intervention to address the below impairments. ?????? ? ? This section established at most recent assessment??????????  PROBLEM LIST (Impairments causing functional limitations):  1. Dysphagia  2. Cognition?   REHABILITATION POTENTIAL FOR STATED GOALS: Good  PLAN OF CARE:   Patient will benefit from skilled intervention to address the following impairments. RECOMMENDATIONS AND PLANNED INTERVENTIONS (Benefits and precautions of therapy have been discussed with the patient.):  · PO:  Regular  · Liquids:  regular thin  MEDICATIONS:  · With liquid  COMPENSATORY STRATEGIES/MODIFICATIONS INCLUDING:  · Small sips and bites  OTHER RECOMMENDATIONS (including follow up treatment recommendations):   · Oral motor exercises  · Family training/education  · Patient education  · cognitive assessment  RECOMMENDED DIET MODIFICATIONS DISCUSSED WITH:  · Nursing  · Patient  FREQUENCY/DURATION: Continue to follow patient 3 times a week for duration of hospital stay to address above goals. RECOMMENDED REHABILITATION/EQUIPMENT: (at time of discharge pending progress): Due to the probability of continued deficits (see above) this patient will likely need continued skilled speech therapy after discharge. SUBJECTIVE:   Pleasant and cooperative. History of Present Injury/Illness: Mr. Radha Cisneros  has a past medical history of Chronic obstructive pulmonary disease ([facility] [address] and Hypertension. Laisha Sotomayor He also  has no past surgical history on file. Present Symptoms: left sided weakness  Pain Intensity 1: 0  Current Medications:   No current facility-administered medications on file prior to encounter. Current Outpatient Prescriptions on File Prior to Encounter   Medication Sig Dispense Refill    pravastatin (PRAVACHOL) 40 mg tablet Take 1 Tab by mouth nightly. 30 Tab 0    aspirin 81 mg chewable tablet Take 1 Tab by mouth daily. 30 Tab 0    diltiazem CD (CARDIZEM CD) 120 mg ER capsule Take 1 Cap by mouth daily.  30 Cap 0     Current Dietary Status:  NPO pending consult       History of reflux:  no  Social History/Home Situation: home with his son     OBJECTIVE:   Respiratory Status:  Nasal cannula  2 l/min  CXR Results:No acute cardiopulmonary process evident on single frontal view of the  chest.  MRI/CT Results: CT negative; MRI pending  Oral Motor Structure/Speech:  [place] Structure/Motor Speech  Labial: Decreased rate, Left droop  Dentition: Natural, Limited  Oral Hygiene: adequate  Lingual: Left deviation (mild)    Cognitive and Communication Status:  Neurologic State: Alert  Orientation Level: Oriented X4  Cognition: Appropriate decision making  Perception: Appears intact  Perseveration: No perseveration noted  Safety/Judgement: Awareness of environment    BEDSIDE SWALLOW EVALUATION  Oral Assessment:  Oral Assessment  Labial: Decreased rate;Left droop  Dentition: Natural;Limited  Oral Hygiene: adequate  Lingual: Left deviation (mild)  P.O. Trials:  Patient Position: upright in bed    The patient was given tsp to straw amounts of the following:   Consistency Presented: Solid; Thin liquid;Mixed consistency  How Presented: Straw;Successive swallows; Self-fed/presented    ORAL PHASE:  Bolus Acceptance: No impairment  Bolus Formation/Control: No impairment  Propulsion: No impairment     Oral Residue: None    PHARYNGEAL PHASE:  Initiation of Swallow: No impairment     Aspiration Signs/Symptoms: None  Vocal Quality: No impairment           Pharyngeal Phase Characteristics: No impairment, issues, or problems     OTHER OBSERVATIONS:  Rate/bite size: WNL   Endurance:   WNL       Tool Used: Dysphagia Outcome and Severity Scale (JING)    Score Comments   Normal Diet  [] 7 With no strategies or extra time needed   Functional Swallow  [x] 6 May have mild oral or pharyngeal delay       Mild Dysphagia    [] 5 Which may require one diet consistency restricted (those who demonstrate penetration which is entirely cleared on MBS would be included)   Mild-Moderate Dysphagia  [] 4 With 1-2 diet consistencies restricted       Moderate Dysphagia  [] 3 With 2 or more diet consistencies restricted       Moderately Severe Dysphagia  [] 2 With partial PO strategies (trials with ST only)       Severe Dysphagia  [] 1 With inability to tolerate any PO safely          Score:  Initial: 6 Most Recent: X (Date: -- ) Interpretation of Tool: The Dysphagia Outcome and Severity Scale (JING) is a simple, easy-to-use, 7-point scale developed to systematically rate the functional severity of dysphagia based on objective assessment and make recommendations for diet level, independence level, and type of nutrition. Score 7 6 5 4 3 2 1   Modifier CH CI CJ CK CL CM CN   ? Swallowing:     - CURRENT STATUS: CI - 1%-19% impaired, limited or restricted    - GOAL STATUS:  CI - 1%-19% impaired, limited or restricted    - D/C STATUS:  CI - 1%-19% impaired, limited or restricted  Payor: CARE IMPROVEMENT PLUS / [place] / Product Type: [place] Medicare /     TREATMENT:    (In addition to Assessment/Re-Assessment sessions the following treatments were rendered)  Assessment/Reassessment only, no treatment provided today  MODALITIES:                                                                    ORAL MOTOR  EXERCISES:                                                                                                                                                                      LARYNGEAL / PHARYNGEAL EXERCISES:                                                                                                                                     __________________________________________________________________________________________________  Safety:   After treatment position/precautions:  · RN notified  · Upright in Bed  Progression/Medical Necessity:   · Skilled intervention continues to be required due to unable to attend/participate in therapy as expected. Compliance with Program/Exercises: Will assess as treatment progresses. Reason for Continuation of Services/Other Comments:  · Patient continues to require skilled intervention due to patient unable to attend/participate in therapy as expected.   Recommendations/Intent for next treatment session: \"Treatment next visit will focus on diet tolerance; oral motor exercises; cognitive assessment\".     Total Treatment Duration:  Time In: 1555  Time Out: 60 [facility] MS, CCC-SLP

## 2018-03-08 NOTE — ED NOTES
TRANSFER - OUT REPORT:    Verbal report given to Thu Lawrence (name) on [facility]  being transferred to [facility] [id number] (unit) for routine progression of care       Report consisted of patients Situation, Background, Assessment and   Recommendations(SBAR). Information from the following report(s) SBAR was reviewed with the receiving nurse. Lines:   Peripheral IV 03/08/18 Right Antecubital (Active)       Peripheral IV 03/08/18 Right Hand (Active)        Opportunity for questions and clarification was provided.       Patient transported with:   [employer]

## 2018-03-08 NOTE — H&P
HOSPITALIST HISTORY AND PHYSICAL  NAME:  Bowen Yi   Age:  68 y.o.  :   1940   MRN:   [id number]  PCP: Taylor Osorio MD  Consulting MD:  Treatment Team: Attending Provider: Kassidy Grimes MD; Primary Nurse: Piedad Yates ADMISSION:left hemiplegia    HPI:   Mr Miguel Dominguez is a 68year old gentleman with history of COPD on inhalers, HTN, Sanjay Chavis, who was relatively well till this morning when he went to go to the bathroom noted to be dragging left foot. There is noted to be slight slurred speech and difficulty swallowing He also noted to have weakness of left upper extremity. He went to bed normal with no acute issues. He has never had these symptoms previously. No history of Afib or arhythmia. He denies any nausea no vomiting. Slight left sided headache. No fever chills no ill contacts. Symptoms still persist    ED COURSE - patient not a candidate for TPA, CT head no acute cva. CTA neck shows 65 % left 45 % right. Complete ROS done and is as stated in HPI or otherwise negative  Past Medical History:   Diagnosis Date    Chronic obstructive pulmonary disease ([facility] [address]     Hypertension       History reviewed. No pertinent surgical history. Prior to Admission Medications   Prescriptions Last Dose Informant Patient Reported? Taking?   aspirin 81 mg chewable tablet   No Yes   Sig: Take 1 Tab by mouth daily. diltiazem CD (CARDIZEM CD) 120 mg ER capsule   No Yes   Sig: Take 1 Cap by mouth daily. ferrous sulfate 325 mg (65 mg iron) tablet   Yes Yes   Sig: Take 325 mg by mouth daily. omeprazole (PRILOSEC) 40 mg capsule   Yes Yes   Sig: Take 40 mg by mouth daily. pravastatin (PRAVACHOL) 40 mg tablet   No Yes   Sig: Take 1 Tab by mouth nightly. Facility-Administered Medications: None     No Known Allergies   Social History   Substance Use Topics    Smoking status: Former Smoker    Smokeless tobacco: Not on file    Alcohol use No      History reviewed.  No pertinent family history. Objective:     Visit Vitals    /71    Pulse 93    Temp 97.7 °F (36.5 °C)    Resp 27    Wt 47.2 kg (104 lb)    SpO2 93%    BMI 15.81 kg/m2      Temp (24hrs), Av.7 °F (36.5 °C), Min:97.7 °F (36.5 °C), Max:97.7 °F (36.5 °C)    Oxygen Therapy  O2 Sat (%): 93 % (18 1433)  Pulse via Oximetry: 94 beats per minute (18 1433)  O2 Device: Nasal cannula (18 1322)  O2 Flow Rate (L/min): 2 l/min (18 1322)  FIO2 (%): 28 % (18 1322)  Physical Exam:  General:    Alert, cooperative, no distress, appears stated age. Head:   Normocephalic, without obvious abnormality, atraumatic. Nose:  Nares normal. No drainage or sinus tenderness. Lungs:   Wheezes heard bilaterally. No crepitations Rhonchi. No rales. Heart:   Regular rate and rhythm,  no murmur, rub or gallop. Abdomen:   Soft, non-tender. Not distended. Bowel sounds normal.   Extremities: No cyanosis. No edema. No clubbing  Skin:     Texture, turgor normal. No rashes or lesions. Not Jaundiced  Neurologic: Alert and oriented x 3,grade 3- power left upper extremity and right lower extremity.  Both   Data Review: personally by me   Recent Results (from the past 24 hour(s))   GLUCOSE, POC    Collection Time: 18 12:09 PM   Result Value Ref Range    Glucose (POC) 103 (H) 65 - 100 mg/dL   POC PT/INR    Collection Time: 18 12:10 PM   Result Value Ref Range    Prothrombin time (POC) 12.6 (H) 9.6 - 11.6 SECS    INR (POC) 1.0 0.9 - 1.2     CBC WITH AUTOMATED DIFF    Collection Time: 18 12:18 PM   Result Value Ref Range    WBC 6.9 4.3 - 11.1 K/uL    RBC 4.82 4.23 - 5.67 M/uL    HGB 13.4 (L) 13.6 - 17.2 g/dL    HCT 41.5 41.1 - 50.3 %    MCV 86.1 79.6 - 97.8 FL    MCH 27.8 26.1 - 32.9 PG    MCHC 32.3 31.4 - 35.0 g/dL    RDW 22.9 (H) 11.9 - 14.6 %    PLATELET 691 808 - 574 K/uL    MPV 9.3 (L) 10.8 - 14.1 FL    DF AUTOMATED      NEUTROPHILS 75 43 - 78 %    LYMPHOCYTES 15 13 - 44 %    MONOCYTES 8 4.0 - 12.0 % EOSINOPHILS 2 0.5 - 7.8 %    BASOPHILS 0 0.0 - 2.0 %    IMMATURE GRANULOCYTES 0 0.0 - 5.0 %    ABS. NEUTROPHILS 5.2 1.7 - 8.2 K/UL    ABS. LYMPHOCYTES 1.0 0.5 - 4.6 K/UL    ABS. MONOCYTES 0.6 0.1 - 1.3 K/UL    ABS. EOSINOPHILS 0.1 0.0 - 0.8 K/UL    ABS. BASOPHILS 0.0 0.0 - 0.2 K/UL    ABS. IMM. GRANS. 0.0 0.0 - 0.5 K/UL   METABOLIC PANEL, BASIC    Collection Time: 03/08/18 12:18 PM   Result Value Ref Range    Sodium 139 136 - 145 mmol/L    Potassium 4.7 3.5 - 5.1 mmol/L    Chloride 103 98 - 107 mmol/L    CO2 32 21 - 32 mmol/L    Anion gap 4 (L) 7 - 16 mmol/L    Glucose 105 (H) 65 - 100 mg/dL    BUN 19 8 - 23 MG/DL    Creatinine 1.38 0.8 - 1.5 MG/DL    GFR est AA >60 >60 ml/min/1.73m2    GFR est non-AA 53 (L) >60 ml/min/1.73m2    Calcium 9.2 8.3 - 10.4 MG/DL   EKG, 12 LEAD, INITIAL    Collection Time: 03/08/18 12:35 PM   Result Value Ref Range    Ventricular Rate 78 BPM    Atrial Rate 78 BPM    P-R Interval 134 ms    QRS Duration 84 ms    Q-T Interval 360 ms    QTC Calculation (Bezet) 410 ms    Calculated P Axis 74 degrees    Calculated R Axis 85 degrees    Calculated T Axis 81 degrees    Diagnosis       !! AGE AND GENDER SPECIFIC ECG ANALYSIS !! Normal sinus rhythm  Septal infarct , age undetermined  T wave abnormality, consider anterior ischemia  Abnormal ECG  When compared with ECG of 20-JUN-2016 16:38,  Premature supraventricular complexes are no longer Present  T wave inversion now evident in Anterior leads  Confirmed by BRADLEY EDOUARD (), Hortencia Dakin ([id number]) on 3/8/2018 2:06:06 PM       Imaging /Procedures /Studies reviewed personally by me  XR Results (most recent):    Results from Hospital Encounter encounter on 03/08/18   XR CHEST PORT   Narrative CHEST X-RAY, single portable view  3/8/2018    History: CVA. Technique: Single frontal view of the chest.    Comparison: Chest x-ray 6/20/2016    Findings: The cardiac silhouette is normal in respect to size. The lungs are expanded  without evidence for pneumothorax. Stable asymmetric lucency is seen in the  upper lung field felt to represent chronic bullous emphysematous changes. No  consolidative airspace process or pleural effusion is seen. Stable chronic  appearing interstitial prominence is seen in the mid and lower lung fields. Impression IMPRESSION:   1. No acute cardiopulmonary process evident on single frontal view of the  chest.              CT Scan    Results from Hospital Encounter encounter on 03/08/18   CTA HEAD NECK W WO CONT   Narrative History: Code stroke. Left sided weakness    Comments: CT ANGIOGRAM OF THE NECK AND CT ANGIOGRAM OF THE [place] was  obtained following the administration of IV contrast. IV contrast was  administered to evaluate the arterial vasculature. Reformatted images in the  coronal and sagittal planes as well as 3-D imaging was obtained and reviewed on  a dedicated PACS and [address]. Radiation reduction dose techniques  were used for the study. Our CT scanner use one or all of the following-  Automated exposure control, adjustment of the mA and/or KV according the patient  size, iterative reconstruction. Findings:    CT ANGIOGRAM OF THE NECK:    Please note there is motion artifact on this exam which does degrade the study  to interpret. The arch and proximal great vessels demonstrate generalized atherosclerotic  changes. 45-50% narrowing is noted at the origin left subclavian artery. Motion artifact is fairly significant at the level of the carotid bulbs. The  right carotid bulb demonstrates eccentric calcified plaque disease with a  proximally 50% narrowing. The left carotid bulb demonstrates narrowing that  extends into the origin of the left internal carotid artery. Degree of stenosis  is 65%. These are somewhat approximated due to motion artifact. The vertebral  arteries are patent. All measurements are based upon NASCET criteria.     Severe emphysematous changes are noted in the lung apices. The thyroid gland is grossly unremarkable. CT ANGIOGRAM OF [place]:    The petrous, cavernous, and supraclinoid internal carotid arteries are patent  with generalized atherosclerotic changes. 50% narrowing is noted in the  cavernous portion of the left internal carotid artery. The anterior and middle  cerebral arteries are patent bilaterally. The distal vertebral arteries, basilar artery, posterior cerebral arteries are  patent bilaterally. No evidence of aneurysm and/or vascular malformation. The orbits and paranasal sinuses are grossly unremarkable with the exception of  very mild disease in the right maxillary sinus. The ventricles are symmetric in size and configuration. The sulci are intact. Impression IMPRESSION:  1. Please note the examination is limited due to motion artifact. The most  significant vascular pathology is at the level of the left carotid bulb and  origin of the left internal carotid artery with a exceed 65% stenosis  2. Significant emphysematous changes in the lung apices        VAS/US Results (most recent):    Results from Hospital Encounter encounter on 06/20/16   DUPLEX CAROTID BILATERAL   Narrative History: Syncope. Sonographic evaluation of the carotid arteries was performed bilaterally    Grayscale imaging on the right demonstrates mild plaque disease at the carotid  bulb. The velocities and ratios are unremarkable. The right vertebral artery  demonstrates antegrade flow without evidence of steal.    Grayscale imaging on the left demonstrates to significant plaque disease at the  carotid bulb. The velocity within the internal carotid artery is markedly  elevated at 635 cm/s systolic and 95 cm/s diastolic. External carotid artery  velocities are also elevated. The ratio of ICA to CCA is elevated at 4.7. The  left vertebral artery demonstrates antegrade flow without evidence of steal.         Impression Impression:  1.  Less than 50% stenosis of the right internal carotid artery. 2. 70-99% stenosis of the left internal carotid artery. 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 Will change to plavix as patient was taking aspirin. Get MRI, PT OT, SLP  Echo  Tsh, lipid panel Continue neuro checks,   · HTN - was elevated permissive htn currently, hold home medications  · HLD - check LDL - high dose statin  · Carotid stenosis - will need on going antiplatelet possibly dual.  · COPD - not in exacerbation - will continue home medications. Monitor for decompensation.  Assess home oxygen prior to discharge    Code Status: full code    Anticipated discharge: greater than 2 midnight stay for acute CVA will require PT OT neurochecks, and further investigations    Signed By: Piter Montgomery MD     March 8, 2018

## 2018-03-09 ENCOUNTER — APPOINTMENT (OUTPATIENT)
Dept: GENERAL RADIOLOGY | Age: 78
DRG: 064 | End: 2018-03-09
Attending: HOSPITALIST
Payer: MEDICARE

## 2018-03-09 PROBLEM — J69.0 ASPIRATION PNEUMONITIS (HCC): Status: ACTIVE | Noted: 2018-03-09

## 2018-03-09 LAB
APPEARANCE UR: CLEAR
APTT PPP: 33.7 SEC (ref 23.2–35.3)
BACTERIA URNS QL MICRO: 0 /HPF
BILIRUB UR QL: NEGATIVE
CASTS URNS QL MICRO: 0 /LPF
CHOLEST SERPL-MCNC: 133 MG/DL
COLOR UR: YELLOW
EPI CELLS #/AREA URNS HPF: 0 /HPF
ERYTHROCYTE [DISTWIDTH] IN BLOOD BY AUTOMATED COUNT: 23.1 % (ref 11.9–14.6)
EST. AVERAGE GLUCOSE BLD GHB EST-MCNC: 111 MG/DL
GLUCOSE UR STRIP.AUTO-MCNC: 100 MG/DL
HBA1C MFR BLD: 5.5 % (ref 4.8–6)
HCT VFR BLD AUTO: 36.2 % (ref 41.1–50.3)
HDLC SERPL-MCNC: 34 MG/DL (ref 40–60)
HDLC SERPL: 3.9 {RATIO}
HGB BLD-MCNC: 11.7 G/DL (ref 13.6–17.2)
HGB UR QL STRIP: ABNORMAL
INR PPP: 1.1
KETONES UR QL STRIP.AUTO: NEGATIVE MG/DL
LDLC SERPL CALC-MCNC: 83 MG/DL
LEUKOCYTE ESTERASE UR QL STRIP.AUTO: NEGATIVE
LIPID PROFILE,FLP: ABNORMAL
MAGNESIUM SERPL-MCNC: 1.8 MG/DL (ref 1.8–2.4)
MCH RBC QN AUTO: 28.1 PG (ref 26.1–32.9)
MCHC RBC AUTO-ENTMCNC: 32.3 G/DL (ref 31.4–35)
MCV RBC AUTO: 86.8 FL (ref 79.6–97.8)
MM INDURATION POC: 0 MM (ref 0–5)
NITRITE UR QL STRIP.AUTO: NEGATIVE
PH UR STRIP: 7.5 [PH] (ref 5–9)
PLATELET # BLD AUTO: 233 K/UL (ref 150–450)
PMV BLD AUTO: 9.3 FL (ref 10.8–14.1)
PPD POC: NORMAL NEGATIVE
PROT UR STRIP-MCNC: NEGATIVE MG/DL
PROTHROMBIN TIME: 13.6 SEC (ref 11.5–14.5)
RBC # BLD AUTO: 4.17 M/UL (ref 4.23–5.67)
RBC #/AREA URNS HPF: >100 /HPF
SP GR UR REFRACTOMETRY: 1.02 (ref 1–1.02)
TRIGL SERPL-MCNC: 80 MG/DL (ref 35–150)
UROBILINOGEN UR QL STRIP.AUTO: 1 EU/DL (ref 0.2–1)
VLDLC SERPL CALC-MCNC: 16 MG/DL (ref 6–23)
WBC # BLD AUTO: 7.2 K/UL (ref 4.3–11.1)
WBC URNS QL MICRO: ABNORMAL /HPF

## 2018-03-09 PROCEDURE — 92526 ORAL FUNCTION THERAPY: CPT

## 2018-03-09 PROCEDURE — 65660000000 HC RM CCU STEPDOWN

## 2018-03-09 PROCEDURE — 94760 N-INVAS EAR/PLS OXIMETRY 1: CPT

## 2018-03-09 PROCEDURE — 74011250636 HC RX REV CODE- 250/636: Performed by: HOSPITALIST

## 2018-03-09 PROCEDURE — 74011250637 HC RX REV CODE- 250/637: Performed by: HOSPITALIST

## 2018-03-09 PROCEDURE — 83735 ASSAY OF MAGNESIUM: CPT | Performed by: HOSPITALIST

## 2018-03-09 PROCEDURE — 71045 X-RAY EXAM CHEST 1 VIEW: CPT

## 2018-03-09 PROCEDURE — 85610 PROTHROMBIN TIME: CPT | Performed by: HOSPITALIST

## 2018-03-09 PROCEDURE — 77010033678 HC OXYGEN DAILY

## 2018-03-09 PROCEDURE — 99221 1ST HOSP IP/OBS SF/LOW 40: CPT | Performed by: PHYSICAL MEDICINE & REHABILITATION

## 2018-03-09 PROCEDURE — 94640 AIRWAY INHALATION TREATMENT: CPT

## 2018-03-09 PROCEDURE — 74011250636 HC RX REV CODE- 250/636: Performed by: EMERGENCY MEDICINE

## 2018-03-09 PROCEDURE — 81001 URINALYSIS AUTO W/SCOPE: CPT | Performed by: HOSPITALIST

## 2018-03-09 PROCEDURE — 97162 PT EVAL MOD COMPLEX 30 MIN: CPT

## 2018-03-09 PROCEDURE — 85027 COMPLETE CBC AUTOMATED: CPT | Performed by: HOSPITALIST

## 2018-03-09 PROCEDURE — 74011000250 HC RX REV CODE- 250: Performed by: HOSPITALIST

## 2018-03-09 PROCEDURE — 83036 HEMOGLOBIN GLYCOSYLATED A1C: CPT | Performed by: HOSPITALIST

## 2018-03-09 PROCEDURE — 74011250637 HC RX REV CODE- 250/637: Performed by: FAMILY MEDICINE

## 2018-03-09 PROCEDURE — 36415 COLL VENOUS BLD VENIPUNCTURE: CPT | Performed by: HOSPITALIST

## 2018-03-09 PROCEDURE — 80061 LIPID PANEL: CPT | Performed by: HOSPITALIST

## 2018-03-09 PROCEDURE — 85730 THROMBOPLASTIN TIME PARTIAL: CPT | Performed by: HOSPITALIST

## 2018-03-09 PROCEDURE — 93306 TTE W/DOPPLER COMPLETE: CPT

## 2018-03-09 RX ORDER — PANTOPRAZOLE SODIUM 40 MG/1
40 TABLET, DELAYED RELEASE ORAL
Status: DISCONTINUED | OUTPATIENT
Start: 2018-03-10 | End: 2018-03-10 | Stop reason: HOSPADM

## 2018-03-09 RX ORDER — IPRATROPIUM BROMIDE AND ALBUTEROL SULFATE 2.5; .5 MG/3ML; MG/3ML
3 SOLUTION RESPIRATORY (INHALATION) ONCE
Status: COMPLETED | OUTPATIENT
Start: 2018-03-09 | End: 2018-03-09

## 2018-03-09 RX ORDER — LEVOFLOXACIN 5 MG/ML
500 INJECTION, SOLUTION INTRAVENOUS
Status: DISCONTINUED | OUTPATIENT
Start: 2018-03-09 | End: 2018-03-10 | Stop reason: HOSPADM

## 2018-03-09 RX ORDER — TRAZODONE HYDROCHLORIDE 50 MG/1
50 TABLET ORAL
Status: DISCONTINUED | OUTPATIENT
Start: 2018-03-09 | End: 2018-03-10 | Stop reason: HOSPADM

## 2018-03-09 RX ADMIN — ENOXAPARIN SODIUM 30 MG: 30 INJECTION SUBCUTANEOUS at 16:25

## 2018-03-09 RX ADMIN — Medication 5 ML: at 22:10

## 2018-03-09 RX ADMIN — Medication 10 ML: at 13:58

## 2018-03-09 RX ADMIN — TIOTROPIUM BROMIDE 18 MCG: 18 CAPSULE ORAL; RESPIRATORY (INHALATION) at 07:29

## 2018-03-09 RX ADMIN — ARFORMOTEROL TARTRATE 15 MCG: 15 SOLUTION RESPIRATORY (INHALATION) at 07:28

## 2018-03-09 RX ADMIN — BUDESONIDE 500 MCG: 0.5 INHALANT RESPIRATORY (INHALATION) at 21:15

## 2018-03-09 RX ADMIN — BUDESONIDE 500 MCG: 0.5 INHALANT RESPIRATORY (INHALATION) at 07:28

## 2018-03-09 RX ADMIN — ACETAMINOPHEN 650 MG: 325 TABLET ORAL at 14:44

## 2018-03-09 RX ADMIN — DILTIAZEM HYDROCHLORIDE 120 MG: 120 CAPSULE, COATED, EXTENDED RELEASE ORAL at 09:24

## 2018-03-09 RX ADMIN — PRAVASTATIN SODIUM 80 MG: 80 TABLET ORAL at 22:10

## 2018-03-09 RX ADMIN — Medication 5 ML: at 05:41

## 2018-03-09 RX ADMIN — LEVOFLOXACIN 500 MG: 5 INJECTION, SOLUTION INTRAVENOUS at 16:21

## 2018-03-09 RX ADMIN — TRAZODONE HYDROCHLORIDE 50 MG: 50 TABLET ORAL at 22:10

## 2018-03-09 RX ADMIN — ARFORMOTEROL TARTRATE 15 MCG: 15 SOLUTION RESPIRATORY (INHALATION) at 21:15

## 2018-03-09 RX ADMIN — IPRATROPIUM BROMIDE AND ALBUTEROL SULFATE 3 ML: .5; 3 SOLUTION RESPIRATORY (INHALATION) at 15:48

## 2018-03-09 RX ADMIN — CLOPIDOGREL BISULFATE 75 MG: 75 TABLET ORAL at 09:24

## 2018-03-09 RX ADMIN — ACETAMINOPHEN 650 MG: 325 TABLET ORAL at 23:39

## 2018-03-09 NOTE — PROGRESS NOTES
Pt given stroke booklet; education provided to the pt per protocol; the opportunity for questions was given;all questions answered.

## 2018-03-09 NOTE — PROGRESS NOTES
Problem: Interdisciplinary Rounds  Goal: Interdisciplinary Rounds  Outcome: Progressing Towards Goal  Interdisciplinary team rounds were held 3/9/2018 with the following team members:Care Management, Physical Therapy, Physician and . Plan of care discussed. See clinical pathway and/or care plan for interventions and desired outcomes.

## 2018-03-09 NOTE — PROGRESS NOTES
Problem: Nutrition Deficit  Goal: *Optimize nutritional status  Nutrition  Reason for assessment: Consult received for Calorie Count (Dr. Araceli Roth)  Malnutrition Screening Tool at admission is incomplete at this time. Assessment:   Diet order(s): regular  Food/Nutrition Patient History:  The patient is s/p acute CVA. History notable for COPD and HTN. The patient reports a UBW of ~120-130 pounds. He states that he has noticed weight loss and that he has been eating less but is unable to state a reason. Per patient, he lives with his son but the patient cooks. He states that he will go out to eat with his girlfriend often. He states that they will go to places that have hamburger steaks. Per patient, diet recall consists of grits and scrambled eggs at breakfast, a sandwich at lunch (pimento cheese, ham, or a \"naner\" sandwich), and dinners often consists of dried beans, soups, cabbage, creamed potatoes, chicken, etc.  He does not consume any form of supplementation but states that if he had some available to him he would drink it. He states that he has bills to pay out of his check and can not afford ensure. I encouraged equate and will also provide coupons. Anthropometrics:    Vitals 6/20/2016   Height 5' 8\"   ,  Weight: 47.2 kg (104 lb),  , Body mass index is 15.81 kg/(m^2). BMI class of underweight. WT / BMI 3/8/2018 6/22/2016 12/24/2015   WEIGHT 104 lb 94 lb 96 lb   Per weights in EMR, the patient has actually had a potential ~10 pound weight gain over ~2 years. Upon observation, the patient had evidence of fat loss and muscle wasting with observable sunken orbital and protruding clavicles. Macronutrient needs:  EER:  0863-8880 kcal /day (30-35 kcal/kg listed BW)  EPR:  47-56 grams protein/day (1-1.2 grams/kg listed BW)(GFR 53 as of 3/8)  Intake/Comparative Standards: No recorded meal intakes in EMR.     Nutrition Diagnosis: Inadequate oral intake r/t decreased ability to consume sufficient oral intake as evidenced by patient underweight for age and visible signs of muscle/fat loss. Intervention:  Meals and snacks: Continue current diet. Added food preferences  Nutrition Supplement Therapy: ensure enlive TID   Placed order for calorie count per Dr. Eli Becker request.   Discharge nutrition plan: Ensure enlive or comparable supplement TID-will f/u to provide patient with coupons.     Salome Parkinson [address], [address] [place] [address], [address] [place] [address], [phone]

## 2018-03-09 NOTE — PROGRESS NOTES
Hospitalist Progress Note    3/9/2018  Admit Date: 3/8/2018 11:56 AM   NAME: Mc [place]   :  1940   MRN:  [id number]   Attending: Ella Olson MD  PCP:  Saleem Soares MD      Admitted for:cva    SUBJECTIVE:   Patient this morning states weakness has improved since yesterday, strength in lower extremity much better. He denies any headache no nausea no vomiting. No palpitations. No vomiting.       Review of Systems negative with exception of pertinent positives noted above  Past medical history unchanged from H&P    PHYSICAL EXAM     Visit Vitals    /78 (BP 1 Location: Right arm, BP Patient Position: At rest)    Pulse 81    Temp 98.6 °F (37 °C)    Resp 18    Wt 47.2 kg (104 lb)    SpO2 91%    BMI 15.81 kg/m2      Temp (24hrs), Av.2 °F (36.8 °C), Min:97.7 °F (36.5 °C), Max:99.1 °F (37.3 °C)    Oxygen Therapy  O2 Sat (%): 91 % (18 0800)  Pulse via Oximetry: 87 beats per minute (18 0729)  O2 Device: Nasal cannula (18 07)  O2 Flow Rate (L/min): 2 l/min (18 0729)  FIO2 (%): 28 % (18 1322)    Intake/Output Summary (Last 24 hours) at 18 1053  Last data filed at 18 0830   Gross per 24 hour   Intake                0 ml   Output              225 ml   Net             -225 ml        General: No acute distress  mucous membranes pink and moist acyanotic anicteric  Neck:  Supple full range of motion  Lungs:  Air entry equal bilaterally, no crepitations rales rhonchi   Heart:  Regular rate and rhythm,  No murmur, rub, or gallop  Abdomen: Soft, Non distended, Non tender, no rebound guarding Positive bowel sounds  Extremities: No cyanosis, clubbing or edema  Neurologic:  Left upper extremity 4/5  Left lower extremity 4/5 much improved  Musculoskeletal: no   Joint swelling tenderness erythema  Skin:  No erythema, rashes noted          LAB  Recent Labs      18   1209   GLUCPOC  103*      Recent Labs      18   0645   WBC  7.2   HGB  11.7* HCT  36.2*   PLT  233   INR  1.1     Recent Labs      03/09/18   0645  03/08/18   1218   NA   --   139   K   --   4.7   CL   --   103   CO2   --   32   GLU   --   105*   BUN   --   19   CREA   --   1.38   MG  1.8   --    CA   --   9.2       EKG and imaging reviewed personally by me  Mri Brain Wo Cont    Result Date: 3/8/2018  Examination: MRI brain without gadolinium History:  acute cva, 68 years Male 68yo who is dragging left foot, slight slurred speech and difficulty swallowing and weakness of left upper extremity. No hx surgery No hx trauma No hx cancer No prior Comparison: MRI brain June 20, 2016, CT brain earlier today Technique: Sagittal T1-weighted, axial FLAIR, axial fast spin-echo T2-weighted, axial T1-weighted, axial gradient echo and coronal fast inversion recovery images of the brain were performed. The study was performed on a 1.5  Bijal MRI unit. There were no contraindications to MRI based on the screening form. Findings: There are small foci of diffusion restriction in the posterior right frontal lobe medially in a watershed distribution, these may represent small embolic infarcts versus watershed infarcts from hypoperfusion or hypovolemia. Bilateral confluent T2/FLAIR-hyperintensities in the periventricular and subcortical white matter, somewhat progressed since prior. These are nonspecific and of uncertain clinical significance, however, differential considerations may include demyelinating disease, migraines, Lyme disease, vasculitis, and chronic small vessel ischemic change. There is no evidence of masses or mass effect, obvious hemorrhage. The ventricles and sulci are appropriate for age. The brainstem and cerebellum are unremarkable. Paranasal sinuses are clear. Fluid in the bilateral mastoid air cells right greater than left consistent with mastoid effusions, similar to prior. Globes and other extracranial soft tissues are unremarkable. Impression: 1.   Small foci of diffusion restriction in the posterior right frontal lobe medially, most likely representing small acute embolic infarcts versus watershed infarcts from hypoperfusion or hypovolemia. 2.  Interval progression of probable mild microvascular disease. 3.  Other chronic findings as above. Ct Head Wo Cont    Result Date: 3/8/2018  CT HEAD WITHOUT CONTRAST, 3/8/2018 History: Left-sided weakness. Comparison: CT head without contrast 6/20/2016 Technique:   5 mm axial scans from the skull base to the vertex. All CT scans performed at this facility use one or all of the following: Automated exposure control, adjustment of the mA and/or kVp according to patient's size, iterative reconstruction. Findings:  No evidence of intracranial hemorrhage is seen. No abnormal extra-axial fluid collections are seen. Stable mild to moderate cortical involutional changes are seen. No evidence for acute hydrocephalus is seen. No evidence of midline shift or herniation is seen. No abnormal edema pattern is seen in a vascular distribution to suggest large artery infarction. Only stable chronic periventricular white matter hypoattenuation is seen. Evaluation with bone windows shows no acute osseous changes. The visualized sinuses, middle ears, and mastoid air cells are well aerated. IMPRESSION:  1. No acute intracranial process evident by noncontrast CT study of the head. Cta Head Neck W Wo Cont    Result Date: 3/8/2018  History: Code stroke. Left sided weakness Comments: CT ANGIOGRAM OF THE NECK AND CT ANGIOGRAM OF THE [place] was obtained following the administration of IV contrast. IV contrast was administered to evaluate the arterial vasculature. Reformatted images in the coronal and sagittal planes as well as 3-D imaging was obtained and reviewed on a dedicated PACS and [address]. Radiation reduction dose techniques were used for the study.  Our CT scanner use one or all of the following- Automated exposure control, adjustment of the mA and/or KV according the patient size, iterative reconstruction. Findings: CT ANGIOGRAM OF THE NECK: Please note there is motion artifact on this exam which does degrade the study to interpret. The arch and proximal great vessels demonstrate generalized atherosclerotic changes. 45-50% narrowing is noted at the origin left subclavian artery. Motion artifact is fairly significant at the level of the carotid bulbs. The right carotid bulb demonstrates eccentric calcified plaque disease with a proximally 50% narrowing. The left carotid bulb demonstrates narrowing that extends into the origin of the left internal carotid artery. Degree of stenosis is 65%. These are somewhat approximated due to motion artifact. The vertebral arteries are patent. All measurements are based upon NASCET criteria. Severe emphysematous changes are noted in the lung apices. The thyroid gland is grossly unremarkable. CT ANGIOGRAM OF [place]: The petrous, cavernous, and supraclinoid internal carotid arteries are patent with generalized atherosclerotic changes. 50% narrowing is noted in the cavernous portion of the left internal carotid artery. The anterior and middle cerebral arteries are patent bilaterally. The distal vertebral arteries, basilar artery, posterior cerebral arteries are patent bilaterally. No evidence of aneurysm and/or vascular malformation. The orbits and paranasal sinuses are grossly unremarkable with the exception of very mild disease in the right maxillary sinus. The ventricles are symmetric in size and configuration. The sulci are intact. IMPRESSION: 1. Please note the examination is limited due to motion artifact. The most significant vascular pathology is at the level of the left carotid bulb and origin of the left internal carotid artery with a exceed 65% stenosis 2.  Significant emphysematous changes in the lung apices    Xr Chest Port    Result Date: 3/8/2018  CHEST X-RAY, single portable view  3/8/2018 History: CVA. Technique: Single frontal view of the chest. Comparison: Chest x-ray 6/20/2016 Findings: The cardiac silhouette is normal in respect to size. The lungs are expanded without evidence for pneumothorax. Stable asymmetric lucency is seen in the upper lung field felt to represent chronic bullous emphysematous changes. No consolidative airspace process or pleural effusion is seen. Stable chronic appearing interstitial prominence is seen in the mid and lower lung fields. IMPRESSION: 1. No acute cardiopulmonary process evident on single frontal view of the chest.     Results for orders placed or performed during the hospital encounter of 03/08/18   EKG, 12 LEAD, INITIAL   Result Value Ref Range    Ventricular Rate 78 BPM    Atrial Rate 78 BPM    P-R Interval 134 ms    QRS Duration 84 ms    Q-T Interval 360 ms    QTC Calculation (Bezet) 410 ms    Calculated P Axis 74 degrees    Calculated R Axis 85 degrees    Calculated T Axis 81 degrees    Diagnosis       !! AGE AND GENDER SPECIFIC ECG ANALYSIS !! Normal sinus rhythm  Septal infarct , age undetermined  T wave abnormality, consider anterior ischemia  Abnormal ECG  When compared with ECG of 20-JUN-2016 16:38,  Premature supraventricular complexes are no longer Present  T wave inversion now evident in Anterior leads  Confirmed by BRADLEY EDOUARD ()Kofi ([id number]) on 3/8/2018 2:06:06 PM       XR Results (most recent):    Results from [place] encounter on 03/08/18   XR CHEST PORT   Narrative CHEST X-RAY, single portable view  3/8/2018    History: CVA. Technique: Single frontal view of the chest.    Comparison: Chest x-ray 6/20/2016    Findings: The cardiac silhouette is normal in respect to size. The lungs are expanded  without evidence for pneumothorax. Stable asymmetric lucency is seen in the  upper lung field felt to represent chronic bullous emphysematous changes.   No  consolidative airspace process or pleural effusion is seen. Stable chronic  appearing interstitial prominence is seen in the mid and lower lung fields. Impression IMPRESSION:   1. No acute cardiopulmonary process evident on single frontal view of the  chest.              Active problems  Active Hospital Problems    Diagnosis Date Noted    CVA (cerebral vascular accident) ([facility] [address] 03/08/2018    Essential hypertension 03/08/2018    Pure hypercholesterolemia 03/08/2018    Pulmonary emphysema ([facility] [address] 03/08/2018    Carotid stenosis, bilateral 06/21/2016       ASSESSMENT  AND PLAN      · Acute CVA - will continue with PT OT, echo pending,  Tolerating plavix. Will continue to monitor , plan likely home with home health if strength continues to improve  · HTN - bp controlled will continue to monitor  · HLD - continue statin, LDL 83   · COPD - no wheezing currently, continue home medications, doing well prn breathing treatments    DVT Prophylaxis: LMWH  dispo - likely home with home health, tomorrow.     Signed By: Lazarus Bark, MD     March 9, 2018

## 2018-03-09 NOTE — PROGRESS NOTES
@6430 pt complaints of stomach pain, vomited. Pt said he felt much better. Checked v/s: 146/81, Sinus Tach 120's, temp 102.4 oral, RR 22, 02 84% on 02 @ 2L, bumped up oxygen to 4L, 02 sat 90% now. @1430 Pt positive for 2 SIRS, called a code SIRS. Team arrived, however Dr Kourtney Trejo also arrived and canceled code SIRS. And said, \"just give him some tylenol. \"  Code canceled. PT given tylenol.

## 2018-03-09 NOTE — PROGRESS NOTES
Attempted OT evaluation. Upon arrival, primary RN at bedside calling Code . Will hold OT evaluation and check back as schedule permits and patient is stable to participate.    [place] JOHN Greenfield, OTR/L

## 2018-03-09 NOTE — PROGRESS NOTES
Problem: Mobility Impaired (Adult and Pediatric)  Goal: *Acute Goals and Plan of Care (Insert Text)  Discharge Goals:  (1.)Mr. Johnnie Ramírez will perform bed mobility with INDEPENDENCE within 7 treatment day(s). (2.)Mr. Johnnie Ramírez will transfer with MODIFIED INDEPENDNECE using the least restrictive device within 7 treatment day(s). (3.)Mr. Johnnie Ramírez will ambulate with MODIFIED INDEPENDENCE for 150+ feet with the least restrictive device within 7 treatment day(s). (4.)Mr. Johnnie Ramírez will demonstrate good dynamic standing balance utilizing least restrictive assistive device within 7 treatment day(s). (5.)Mr. Johnnie Ramírez will tolerate 25+ minutes of therapeutic activity/exercise and/or neuromuscular re-education while maintaining stable vitals to improve functional strength and mobility within 3 day(s). ________________________________________________________________________________________________      PHYSICAL THERAPY: Initial Assessment, AM 3/9/2018  INPATIENT: Hospital Day: 2  Payor: CARE IMPROVEMENT PLUS [place] / Product Type: Managed Care Medicare /      NAME/AGE/GENDER: Nallely Diaz is a 68 y.o. male   PRIMARY DIAGNOSIS: CVA (cerebral vascular accident) ([address] CVA (cerebral vascular accident) ([facility] [address] CVA (cerebral vascular accident) ([facility] [address]        ICD-10: Treatment Diagnosis:   · Difficulty in walking, Not elsewhere classified (R26.2)  · Hemiplegia and hemiparesis following cerebral infarction affecting left non-dominant side (U48.904)   Precaution/Allergies:  Review of patient's allergies indicates no known allergies. ASSESSMENT:     Mr. Johnnie Ramírez presents is a 68year old male admitted with acute CVA with left sided weakness. Patient seen this AM for initial physical therapy evaluation: presents supine in bed, oriented x3, and endorses 0/10 pain. Patient lives with his son in a single story residence with 4 steps to enter.  At baseline, patient is independent with ADLs, ambulates community level distances without utilizing an assistive device, and drives. Today, L UE strength 3+/5 compared to 4-/5 on L, L LE strength 4-/5 compared to 4+/5 on left, decreased L UE coordination appreciated, and sensation is intact to light touch distal  BUE/LE. Patient performed bed mobility with CGA, transfers with minimal assistance x1, and ambulation x5ft with RW and minimal assistance x1. Demonstrated a slow, step-to gait pattern with decreased dynamic standing balance with posterior LOB appreciated. Increased work of breathing appreciated; cued patient for breathing with supplemental 02 intact. Mr. Patrick Chu with decreased functional mobility and balance/gait status from baseline. Patient is a fall risk at this time secondary to dynamic balance status (fair-). Recommend continued skilled PT services to address stated deficits. Will follow and progress toward stated goals during acute stay. This section established at most recent assessment   PROBLEM LIST (Impairments causing functional limitations):  1. Decreased Strength  2. Decreased ADL/Functional Activities  3. Decreased Transfer Abilities  4. Decreased Ambulation Ability/Technique  5. Decreased Balance  6. Decreased Activity Tolerance  7. Decreased Flexibility/Joint Mobility  8. Decreased Knowledge of Precautions  9. Decreased [place] with Home Exercise Program   INTERVENTIONS PLANNED: (Benefits and precautions of physical therapy have been discussed with the patient.)  1. Balance Exercise  2. Bed Mobility  3. Family Education  4. Gait Training  5. Neuromuscular Re-education/Strengthening  6. Range of Motion (ROM)  7. Therapeutic Activites  8. Therapeutic Exercise/Strengthening  9. Transfer Training  10.  Group Therapy     TREATMENT PLAN: Frequency/Duration: 3 times a week for duration of hospital stay  Rehabilitation Potential For Stated Goals: Good     RECOMMENDED REHABILITATION/EQUIPMENT: (at time of discharge pending progress): Due to the probability of continued deficits (see above) this patient will likely need continued skilled physical therapy after discharge. Equipment:    TBD pending patient progress              HISTORY:   History of Present Injury/Illness (Reason for Referral):  CVA  Past Medical History/Comorbidities:   Mr. Elijah Finn  has a past medical history of Chronic obstructive pulmonary disease ([facility] [address] and Hypertension. Mr. Elijah Finn  has no past surgical history on file. Social History/Living Environment:   Home Environment: Private residence  # Steps to Enter: 4  Rails to Enter: Yes  One/Two Story Residence: One story  Living Alone: No  Support Systems: Child(bernardo)  Patient Expects to be Discharged to[de-identified] Private residence  Current DME Used/Available at Home: Cane, straight  Prior Level of Function/Work/Activity:  Patient lives with his son in a single story residence with 4 steps to enter. At baseline, patient is independent with ADLs, ambulates community level distances without utilizing an assistive device, and drives. Number of Personal Factors/Comorbidities that affect the Plan of Care: 1-2: MODERATE COMPLEXITY   EXAMINATION:   Most Recent Physical Functioning:   Gross Assessment:  AROM: Generally decreased, functional (L UE/LE)  Strength: Generally decreased, functional (L UE/LE)  Coordination: Generally decreased, functional (L UE/LE)  Sensation: Intact (Light touch B UE/LE)               Posture:  Posture (WDL): Exceptions to WDL  Posture Assessment: Forward head, Rounded shoulders  Balance:  Sitting: Impaired  Sitting - Static: Good (unsupported)  Sitting - Dynamic: Fair (occasional)  Standing: Impaired  Standing - Static: Fair  Standing - Dynamic : Fair (-) Bed Mobility:  Supine to Sit: Contact guard assistance  Sit to Supine: Supervision  Interventions: Safety awareness training; Tactile cues  Wheelchair Mobility:     Transfers:  Sit to Stand: Minimum assistance  Stand to Sit: Minimum assistance  Gait:     Base of Support: Narrowed; Center of gravity altered  Step Length: Left shortened;Right shortened  Gait Abnormalities: Decreased step clearance; Step to gait;Trunk sway increased  Distance (ft): 5 Feet (ft)  Assistive Device: Walker, rolling  Ambulation - Level of Assistance: Contact guard assistance;Minimal assistance  Interventions: Safety awareness training; Tactile cues; Verbal cues      Body Structures Involved:  1. Nerves  2. Muscles Body Functions Affected:  1. Neuromusculoskeletal  2. Movement Related Activities and Participation Affected:  1. Mobility  2. Self Care   Number of elements that affect the Plan of Care: 4+: HIGH COMPLEXITY   CLINICAL PRESENTATION:   Presentation: Evolving clinical presentation with changing clinical characteristics: MODERATE COMPLEXITY   CLINICAL DECISION MAKIN [facility] Inpatient Short Form  How much difficulty does the patient currently have. .. Unable A Lot A Little None   1. Turning over in bed (including adjusting bedclothes, sheets and blankets)? [] 1   [] 2   [] 3   [x] 4   2. Sitting down on and standing up from a chair with arms ( e.g., wheelchair, bedside commode, etc.)   [] 1   [] 2   [x] 3   [] 4   3. Moving from lying on back to sitting on the side of the bed? [] 1   [] 2   [x] 3   [] 4   How much help from another person does the patient currently need. .. Total A Lot A Little None   4. Moving to and from a bed to a chair (including a wheelchair)? [] 1   [] 2   [x] 3   [] 4   5. Need to walk in hospital room? [] 1   [x] 2   [] 3   [] 4   6. Climbing 3-5 steps with a railing? [] 1   [x] 2   [] 3   [] 4   © 2007, [address] [address], under license to [employer]. All rights reserved      Score:  Initial: 17 Most Recent: 17 (Date: 3/9/18 )    Interpretation of Tool:  Represents activities that are increasingly more difficult (i.e. Bed mobility, Transfers, Gait).    Score 24 23 22-20 19-15 14-10 9-7 6 Modifier CH CI CJ CK CL CM CN      ? Mobility - Walking and Moving Around:     - CURRENT STATUS: CK - 40%-59% impaired, limited or restricted    - GOAL STATUS: CJ - 20%-39% impaired, limited or restricted    - D/C STATUS:  ---------------To be determined---------------  Payor: CARE IMPROVEMENT PLUS [place] / Product Type: [employer] Medicare /      Medical Necessity:     · Patient demonstrates good rehab potential due to higher previous functional level. Reason for Services/Other Comments:  · Patient continues to require skilled intervention due to decreased functional mobility and balance/gait status from baseline. .   Use of outcome tool(s) and clinical judgement create a POC that gives a: Questionable prediction of patient's progress: MODERATE COMPLEXITY            TREATMENT:   (In addition to Assessment/Re-Assessment sessions the following treatments were rendered)   Pre-treatment Symptoms/Complaints:    Pain: Initial:   Pain Intensity 1: 0  Post Session:  0/10     Assessment/Reassessment only, no treatment provided today    Braces/Orthotics/Lines/Etc:   · IV  · O2 Device: Nasal cannula  Treatment/Session Assessment:    · Response to Treatment:  See above. · Interdisciplinary Collaboration:   o Physical Therapist  o Registered Nurse  o Physician  · After treatment position/precautions:   o Supine in bed  o Bed alarm/tab alert on  o Bed/Chair-wheels locked  o Bed in low position  o Call light within reach  o RN notified  o Family at bedside  o Side rails x 3   · Compliance with Program/Exercises: Will assess as treatment progresses. · Recommendations/Intent for next treatment session: \"Next visit will focus on advancements to more challenging activities and reduction in assistance provided\".   Total Treatment Duration:  PT Patient Time In/Time Out  Time In: 1115  Time Out: [place] [address], DPT

## 2018-03-09 NOTE — CONSULTS
Physical Medicine & Rehabilitation Note-consult    Patient: Bowen Yi MRN: [id number]  SSN: [id number]    YOB: 1940  Age: 68 y.o. Sex: male      Admit Date: 3/8/2018  Admitting Physician: Karla Brown MD    Medical Decision Making/Plan/Recommend: Functional deficit, mobility, gait impairment. Left sided weakness, control improving. Start acute PT, OT. Continue gait training, transfer training, balance activities, exercises to improve strength and balance to maximize ADLs and functional mobility. Will follow functional assessment per PT/ OT to determine rehab plans. Thank you for the opportunity to participate in the care of this patient. Chief Complaint : Gait dysfunction secondary to below. Admit Diagnosis: CVA (cerebral vascular accident) ([facility]  right hemiparesis  left  hemiparesis  weakness  Pain  DVT risk  Post op anemia  Acute Rehab Dx:  Dysarthria  Dysphagia  Aphasia  Debility    deconditioning  Mobility and ambulation deficits  Self Care/ADL deficits    Medical Dx:  Past Medical History:   Diagnosis Date    Chronic obstructive pulmonary disease ([facility] [facility]     Hypertension      Subjective:     Date of Evaluation:  March 9, 2018    HPI: Bowen Yi is a 68 y.o. male patient at [address] [place] [address] who was admitted on 3/8/2018  by Karla Brown MD with below mentioned medical history ,is being seen for Physical Medicine and Rehabilitation consult. Bowen Yi presented with acute onset left sided weakness, slurred speech, difficulty swallowing. MRI showed small foci of diffusion restriction in the posterior right frontal lobe medially, most likely representing small acute embolic infarcts versus watershed infarcts from hypoperfusion. He was admitted with diagnosis of acute CVA and started on medical management for acute stroke and secondary prevention. The patient's admission course has been complicated by elevated BP requiring close management. We are consulted to assist with rehab needs and placement. Patient  to participate in therapies with acute PT, OT and ST. He states his left sided weakness has much improved since initial presentation. He is able to move the LUE, LLE against gravity , where as he wasn't able to at admission. He  wikl have left sided deficits, limiting his mobility, ambulation and ADLs. Yusuf Wagner is seen and examined today. Medical Records reviewed. He states he is independent with all activities prior to this event. Current Level of Function:   bed mobility -  , transfers -  , decreased balance , ambulation -     Prior level of function - independent. History reviewed. No pertinent family history. Social History   Substance Use Topics    Smoking status: Former Smoker    Smokeless tobacco: Not on file    Alcohol use No     History reviewed. No pertinent surgical history. Prior to Admission medications    Medication Sig Start Date End Date Taking? Authorizing Provider   omeprazole (PRILOSEC) 40 mg capsule Take 40 mg by mouth daily. Yes Kenji De La Garza MD   ferrous sulfate 325 mg (65 mg iron) tablet Take 325 mg by mouth daily. Yes Kenji De La Garza MD   pravastatin (PRAVACHOL) 40 mg tablet Take 1 Tab by mouth nightly. 16  Yes Garima Rubin MD   aspirin 81 mg chewable tablet Take 1 Tab by mouth daily. 16  Yes Garima Rubin MD   diltiazem CD (CARDIZEM CD) 120 mg ER capsule Take 1 Cap by mouth daily. 16  Yes Garima Rubin MD     No Known Allergies     Review of Systems: Denies chest pain, shortness of breath, cough, headache, visual problems, abdominal pain, dysurea, calf pain. Pertinent positives are as noted in the medical records and unremarkable otherwise. Objective:     Vitals:  Blood pressure 130/78, pulse 81, temperature 98.6 °F (37 °C), resp. rate 18, weight 104 lb (47.2 kg), SpO2 91 %.   Temp (24hrs), Av.2 °F (36.8 °C), Min:97.7 °F (36.5 °C), Max:99.1 °F (37.3 °C)        Intake and Output:  03/07 1901 - 03/09 0700  In: -   Out: 125 [Urine:125]    Physical Exam:   General: Alert and age appropriately oriented. Frail. No acute cardio respiratory distress. HEENT: Normocephalic,no scleral icterus  Oral mucosa moist without cyanosis, No bruit, No JVD. Lungs: Clear to auscultation  bilaterally. Respiration even and unlabored   Heart: Regular rate and rhythm, S1, S2   No  murmurs, clicks, rub or gallops   Abdomen: Soft, non-tender, nondistended. Bowel sounds present. No organomegaly. Genitourinary: Benign . Neuromuscular:      PERRL, EOMI  Follows simple commands consistently. Able to identify, recall repeat. Mood appropriate. Insight, judgement intact. LUE     Shoulder abduction   4/5              Elbow flexion:  4 /5               Wrist extension:   4/5              Finger flexion;  4 /5   ADMQ:  4 /5  RUE    Shoulder abduction:  5/5                Elbow flexion:  5 /5    Wrist extension: 5 /5   Finger flexion:  5 /5   ADMQ:  5 /5  LLE     Hip flexion:  4 /5              Knee extension:   4/5    Ankle dorsiflexion:  4 /5   Ankle plantarflexion:  4 /5        RLE     Hip flexion:  5- /5   Knee extension:  5- /5    Ankle dorsiflexion:  5 /5   Ankle plantarflexion:   5/5  Sensory - intact grossly  No cerebellar signs.  + Mild generalized atrophy, no fasciculations, no tremors. extremity: no erythema. Calf non tender BLE.                                                                                         Labs/Studies:  Recent Results (from the past 72 hour(s))   GLUCOSE, POC    Collection Time: 03/08/18 12:09 PM   Result Value Ref Range    Glucose (POC) 103 (H) 65 - 100 mg/dL   POC PT/INR    Collection Time: 03/08/18 12:10 PM   Result Value Ref Range    Prothrombin time (POC) 12.6 (H) 9.6 - 11.6 SECS    INR (POC) 1.0 0.9 - 1.2     CBC WITH AUTOMATED DIFF    Collection Time: 03/08/18 12:18 PM   Result Value Ref Range    WBC 6.9 4.3 - 11.1 K/uL    RBC 4.82 4.23 - 5.67 M/uL    HGB 13.4 (L) 13.6 - 17.2 g/dL    HCT 41.5 41.1 - 50.3 %    MCV 86.1 79.6 - 97.8 FL    MCH 27.8 26.1 - 32.9 PG    MCHC 32.3 31.4 - 35.0 g/dL    RDW 22.9 (H) 11.9 - 14.6 %    PLATELET 754 745 - 631 K/uL    MPV 9.3 (L) 10.8 - 14.1 FL    DF AUTOMATED      NEUTROPHILS 75 43 - 78 %    LYMPHOCYTES 15 13 - 44 %    MONOCYTES 8 4.0 - 12.0 %    EOSINOPHILS 2 0.5 - 7.8 %    BASOPHILS 0 0.0 - 2.0 %    IMMATURE GRANULOCYTES 0 0.0 - 5.0 %    ABS. NEUTROPHILS 5.2 1.7 - 8.2 K/UL    ABS. LYMPHOCYTES 1.0 0.5 - 4.6 K/UL    ABS. MONOCYTES 0.6 0.1 - 1.3 K/UL    ABS. EOSINOPHILS 0.1 0.0 - 0.8 K/UL    ABS. BASOPHILS 0.0 0.0 - 0.2 K/UL    ABS. IMM. GRANS. 0.0 0.0 - 0.5 K/UL   METABOLIC PANEL, BASIC    Collection Time: 03/08/18 12:18 PM   Result Value Ref Range    Sodium 139 136 - 145 mmol/L    Potassium 4.7 3.5 - 5.1 mmol/L    Chloride 103 98 - 107 mmol/L    CO2 32 21 - 32 mmol/L    Anion gap 4 (L) 7 - 16 mmol/L    Glucose 105 (H) 65 - 100 mg/dL    BUN 19 8 - 23 MG/DL    Creatinine 1.38 0.8 - 1.5 MG/DL    GFR est AA >60 >60 ml/min/1.73m2    GFR est non-AA 53 (L) >60 ml/min/1.73m2    Calcium 9.2 8.3 - 10.4 MG/DL   TSH 3RD GENERATION    Collection Time: 03/08/18 12:18 PM   Result Value Ref Range    TSH 1.070 0.358 - 3.740 uIU/mL   EKG, 12 LEAD, INITIAL    Collection Time: 03/08/18 12:35 PM   Result Value Ref Range    Ventricular Rate 78 BPM    Atrial Rate 78 BPM    P-R Interval 134 ms    QRS Duration 84 ms    Q-T Interval 360 ms    QTC Calculation (Bezet) 410 ms    Calculated P Axis 74 degrees    Calculated R Axis 85 degrees    Calculated T Axis 81 degrees    Diagnosis       !! AGE AND GENDER SPECIFIC ECG ANALYSIS !!   Normal sinus rhythm  Septal infarct , age undetermined  T wave abnormality, consider anterior ischemia  Abnormal ECG  When compared with ECG of 20-JUN-2016 16:38,  Premature supraventricular complexes are no longer Present  T wave inversion now evident in Anterior leads  Confirmed by BRADLEY EDOUARD (), MECHE COATES ([id number]) on 3/8/2018 2:06:06 PM     LIPID PANEL    Collection Time: 03/09/18  6:45 AM   Result Value Ref Range    LIPID PROFILE          Cholesterol, total 133 <200 MG/DL    Triglyceride 80 35 - 150 MG/DL    HDL Cholesterol 34 (L) 40 - 60 MG/DL    LDL, calculated 83 <100 MG/DL    VLDL, calculated 16 6.0 - 23.0 MG/DL    CHOL/HDL Ratio 3.9     HEMOGLOBIN A1C WITH EAG    Collection Time: 03/09/18  6:45 AM   Result Value Ref Range    Hemoglobin A1c 5.5 4.8 - 6.0 %    Est. average glucose 111 mg/dL   CBC W/O DIFF    Collection Time: 03/09/18  6:45 AM   Result Value Ref Range    WBC 7.2 4.3 - 11.1 K/uL    RBC 4.17 (L) 4.23 - 5.67 M/uL    HGB 11.7 (L) 13.6 - 17.2 g/dL    HCT 36.2 (L) 41.1 - 50.3 %    MCV 86.8 79.6 - 97.8 FL    MCH 28.1 26.1 - 32.9 PG    MCHC 32.3 31.4 - 35.0 g/dL    RDW 23.1 (H) 11.9 - 14.6 %    PLATELET 198 256 - 105 K/uL    MPV 9.3 (L) 10.8 - 14.1 FL   PROTHROMBIN TIME + INR    Collection Time: 03/09/18  6:45 AM   Result Value Ref Range    Prothrombin time 13.6 11.5 - 14.5 sec    INR 1.1     PTT    Collection Time: 03/09/18  6:45 AM   Result Value Ref Range    aPTT 33.7 23.2 - 35.3 SEC   MAGNESIUM    Collection Time: 03/09/18  6:45 AM   Result Value Ref Range    Magnesium 1.8 1.8 - 2.4 mg/dL       Functional Assessment:  Reviewed participation and progress in therapies   requires assist    Ambulation:  Requires assist.  Pending evaluation per PT/ OT.     Impression/Plan:     Principal Problem:    CVA (cerebral vascular accident) ([facility] (3/8/2018)    Active Problems:    Carotid stenosis, bilateral (6/21/2016)      Essential hypertension (3/8/2018)      Pure hypercholesterolemia (3/8/2018)      Pulmonary emphysema ([facility] [address] (3/8/2018)        Current Facility-Administered Medications   Medication Dose Route Frequency Provider Last Rate Last Dose    influenza vaccine 2017-18 (3 yrs+)(PF) (FLUZONE QUAD/FLUARIX QUAD) injection 0.5 mL  0.5 mL IntraMUSCular PRIOR TO DISCHARGE Piter Montgomery MD        dilTIAZeemmanuel CD (CARDIZEM CD) capsule 120 mg  120 mg Oral DAILY Chi Villareal MD   120 mg at 03/09/18 4154    sodium chloride (NS) flush 5-10 mL  5-10 mL IntraVENous Q8H Chi Villareal MD   5 mL at 03/09/18 0541    sodium chloride (NS) flush 5-10 mL  5-10 mL IntraVENous PRN Chi Villareal MD        0.9% sodium chloride infusion  100 mL/hr IntraVENous CONTINUOUS Chi Villareal  mL/hr at 03/08/18 1706 100 mL/hr at 03/08/18 1706    clopidogrel (PLAVIX) tablet 75 mg  75 mg Oral DAILY Chi Villareal MD   75 mg at 03/09/18 1058    pravastatin (PRAVACHOL) tablet 80 mg  80 mg Oral QHS Chi Villareal MD   80 mg at 03/08/18 2117    acetaminophen (TYLENOL) tablet 650 mg  650 mg Oral Q4H PRN Chi Villareal MD        labetalol (NORMODYNE;TRANDATE) injection 5 mg  5 mg IntraVENous Q10MIN PRN Chi Villareal MD        bisacodyl (DULCOLAX) suppository 10 mg  10 mg Rectal DAILY PRN Chi Villareal MD        tuberculin injection 5 Units  5 Units IntraDERMal ONCE Chi Villareal MD   5 Units at 03/08/18 2130    tiotropium (SPIRIVA) inhalation capsule 18 mcg  1 Cap Inhalation DAILY Chi Villareal MD   18 mcg at 03/09/18 0729    albuterol (PROVENTIL VENTOLIN) nebulizer solution 2.5 mg  2.5 mg Nebulization Q4H PRN Chi Villareal MD        arformoterol [facility]) neb solution 15 mcg  15 mcg Nebulization BID RT Chi Villareal MD   15 mcg at 03/09/18 0728    budesonide (PULMICORT) 500 mcg/2 ml nebulizer suspension  500 mcg Nebulization BID RT Chi Villareal MD   500 mcg at 03/09/18 5258    enoxaparin (LOVENOX) injection 30 mg  30 mg SubCUTAneous Q24H Sandeep Mattson MD   Stopped at 03/08/18 2118        Recommendations: will follow PT/ OT to determine rehab plans. Continue Acute Rehab Program.  Coordination of rehab/medical care. Counseling of Physical Medicine & Rehab care issues management. Rehabilitation Management: 1. Devices: Adaptive equipments, Walkers, Type: Rolling Walker  2. Consult:Rehab team including PT, OT, SLT and  3. Disposition Rehab-discussed with patient/ family  4. Plexipulse when in bed  5. Thigh-high or knee-high JOSIAH's when out of bed  6. DVT Prophylaxis per primary     Medical Management: Per primary  1. Vital signs per routine  2. Incentive spirometer Q1H while awake  3. Potential for neurogenic bladder. -Check post-void residual every shift; In and Out catheterize if post-void residual is morethan 400ml  4. Potential for neurogenic bowel - adequate bowel program recommmended. keep stool soft. Janey colace bid, suppositories prn.   5. Activity: as tolerated; fall precautions. Thank you for the opportunity to participate in the care of this patient.   Signed By: Anne Marroquin MD     March 9, 2018

## 2018-03-09 NOTE — PROGRESS NOTES
Problem: Falls - Risk of  Goal: *Absence of Falls  Document Radha Fall Risk and appropriate interventions in the flowsheet.    Outcome: Progressing Towards Goal  Fall Risk Interventions:  Mobility Interventions: Communicate number of staff needed for ambulation/transfer              Elimination Interventions: Urinal in reach

## 2018-03-09 NOTE — PROGRESS NOTES
Spoke with Sepsis Navigator  Yon De La Rosa NP,  and explained patients condition. Explained the RN called Rapid Response Code SIRS and that the code was cancelled by MD. Mauricio Kapoor recommended to request orders for blood cultures and a lactic acid based on patients condition.

## 2018-03-09 NOTE — PROGRESS NOTES
Spoke with patient regarding d/c planning. Lives in own home with son Jasper Redd. 2/3 step entries  Son provides all of his support and assistance if needed. Prior to admission patient was able to perform all of his own ADL's and drive. PCP- Dr Tj Reilly and last seen one week ago  DME- cane  Denies utilizing oxygen at home just inhalers for his COPD. Denies having any problems filling his Rx. Working with PT/OT/ST at this time. Dr. Joni Junior to evaluate patient. CM will continue to follow for any further needs. Care Management Interventions  PCP Verified by CM: Yes  Mode of Transport at Discharge:  Other (see comment) (family)  Transition of Care Consult (CM Consult): Discharge Planning  Physical Therapy Consult: Yes  Occupational Therapy Consult: Yes  Speech Therapy Consult: Yes  Current Support Network: Own Home  Confirm Follow Up Transport: Self  Plan discussed with Pt/Family/Caregiver: Yes  Freedom of Choice Offered: Yes  Discharge Location  Discharge Placement: Unable to determine at this time

## 2018-03-09 NOTE — PROGRESS NOTES
Hospitalist Follow Up Progress Note    3/9/2018       NAME: Matthew Jarquin   :  1940   MRN:  [id number]   Attending: Daren Gagnon MD  PCP:  Rica Khoury MD  Treatment Team: Attending Provider: Daren Gagnon MD; Consulting Provider: Hilary Sommer MD; Care Manager: Lopez Prajapati, RN; Utilization Review: Ravi Odell RN  Patient was noted to be vomiting, post vomiting noted to have fever and be tachycardic      Visit Vitals    /81    Pulse (!) 122    Temp (!) 102.4 °F (39.1 °C)  Comment: primary RN emily notified     Resp 18    Wt 47.2 kg (104 lb)    SpO2 90%    BMI 15.81 kg/m2     Active Hospital Problems    Diagnosis Date Noted    Aspiration pneumonitis ([address] 2018    CVA (cerebral vascular accident) ([address] 2018    Essential hypertension 2018    Pure hypercholesterolemia 2018    Pulmonary emphysema ([address] 2018    Carotid stenosis, bilateral 2016     Plan:will get UA, chest xray  Patient was vomiting, likely aspirated a little causing pneumonitis will give breathing treatment. Will give empiric antibiotics, levaquin. Tylenol for fever.     Signed By: Daren Gagnon MD     2018

## 2018-03-09 NOTE — PROGRESS NOTES
Paged Dr. Muller Fearing and relayed that the Sepsis Navigator requested orders for blood cultures and a lactic acid. Dr. Muller Fearing insisted that only a UA be obtained. Mentioned to Dr. Muller Fearing that patient is requiring increased amounts of oxygen to maintain acceptable saturation levels. Dr. Muller Fearing responded that patient probably aspirated when vomiting. Chest X ray ordered by MD with verbal read back and confirmation.

## 2018-03-09 NOTE — DISCHARGE INSTRUCTIONS
NUTRITION       Continue Oral Nutrition Supplement (ONS) at discharge. Recommend Ensure Enlive or a comparable/similar product Three times daily for 30 days unless otherwise directed by your Primary Care Physician. Cherylene Bicker, RD, LD    Stroke: After Your Visit     Your Care Instructions     You have had a stroke. Risk factors for stroke include being overweight, smoking, and sedentary lifestyle. This means that the blood flow to a part of your brain was blocked for some time, which damages the nerve cells in that part of the brain. The part of your body controlled by that part of your brain may not function properly now. The brain is an amazing organ that can heal itself to some degree. The stroke you had damaged part of your brain, but other parts of your brain may take over in some way for the damaged areas. You have already started this process. Going home may be hard for you and your family. The more you can try to do for yourself, the better. Remember to take each day one at a time. Follow-up care is a key part of your treatment and safety. Be sure to make and go to all appointments, and call your doctor if you are having problems. Its also a good idea to know your test results and keep a list of the medicines you take. How can you care for yourself at home? Enter a stroke rehabilitation (rehab) program, if your doctor recommends it. Physical, speech, and occupational therapies can help you manage bathing, dressing, eating, and other basics of daily living. Eat a heart-healthy diet that is low in cholesterol, saturated fat, and salt. Eat lots of fresh fruits and vegetables and foods high in fiber. Increase your activities slowly. Take short rest breaks when you get tired. Gradually increase the amount you walk. Start out by walking a little more than you did the day before. Do not drive until your doctor says it is okay. It is normal to feel sad or depressed after a stroke.  If the blues last, talk to your doctor. If you are having problems with urine leakage, go to the bathroom at regular times, including when you first wake up and at bedtime. Also, limit fluids after dinner. If you are constipated, drink plenty of fluids, enough so that your urine is light yellow or clear like water. If you have kidney, heart, or liver disease and have to limit fluids, talk with your doctor before you increase the amount of fluids you drink. Set up a regular time for using the toilet. If you continue to have constipation, your doctor may suggest using a bulking agent, such as Metamucil, or a stool softener, laxative, or enema. Medicines  Take your medicines exactly as prescribed. Call your doctor if you think you are having a problem with your medicine. You may be taking several medicines. ACE (angiotensin-converting enzyme) inhibitors, angiotensin II receptor blockers (ARBs), beta-blockers, diuretics (water pills), and calcium channel blockers control your blood pressure. Statins help lower cholesterol. Your doctor may also prescribe medicines for depression, pain, sleep problems, anxiety, or agitation. If your doctor has given you medicine that prevents blood clots, such as warfarin (Coumadin), aspirin combined with extended-release dipyridamole (Aggrenox), clopidogrel (Plavix), or aspirin to prevent another stroke, you should:  Tell your dentist, pharmacist, and other health professionals that you take these medicines. Watch for unusual bruising or bleeding, such as blood in your urine, red or black stools, or bleeding from your nose or gums. Get regular blood tests to check your clotting time if you are taking Coumadin. Wear medical alert jewelry that says you take blood thinners. You can buy this at most [employer].   Do not take any over-the-counter medicines or herbal products without talking to your doctor first.  If you take birth control pills or hormone replacement therapy, talk to your doctor about whether they are right for you. For family members and caregivers  Make the home safe. Set up a room so that your loved one does not have to climb stairs. Be sure the bathroom is on the same floor. Move throw rugs and furniture that could cause falls, and make sure that the lighting is good. Put grab bars and seats in tubs and showers. Find out what your loved one can do and what he or she needs help with. Try not to do things for your loved one that your loved one can do on his or her own. Help him or her learn and practice new skills. Visit and talk with your loved one often. Try doing activities together that you both enjoy, such as playing cards or board games. Keep in touch with your loved one's friends as much as you can, and encourage them to visit. Take care of yourself. Do not try to do everything yourself. Ask other family members to help. Eat well, get enough rest, and take time to do things that you enjoy. Keep up with your own doctor visits, and make sure to take your medicines regularly. Get out of the house as much as you can. Join a local support group. Find out if you qualify for home health care visits to help with rehab or for adult day care. When should you call for help? Call 911 anytime you think you may need emergency care. For example, call if:  You have signs of another stroke. These may include:  Sudden numbness, paralysis, or weakness in your face, arm, or leg, especially on only one side of your body. New problems with walking or balance. Sudden vision changes. Drooling or slurred speech. New problems speaking or understanding simple statements, or you feel confused. A sudden, severe headache that is different from past headaches. Call 911 even if these symptoms go away in a few minutes. You cough up blood. You vomit blood or what looks like coffee grounds. You pass maroon or very bloody stools.     Call your doctor now or seek immediate medical care if:  You have new bruises or blood spots under your skin. You have a nosebleed. Your gums bleed when you brush your teeth. You have blood in your urine. Your stools are black and tarlike or have streaks of blood. You have vaginal bleeding when you are not having your period, or heavy period bleeding. You have new symptoms that may be related to your stroke, such as falls or trouble swallowing. Watch closely for changes in your health, and be sure to contact your doctor if you have any problems. Where can you learn more? Go to [employer]Flyer.be    Enter C294  in the search box to learn more about \"Stroke: After Your Visit\". © 7457-3757 Healthwise, [employer]. Care instructions adapted under license by [employer] (which disclaims liability or warranty for this information). This care instruction is for use with your licensed healthcare professional. If you have questions about a medical condition or this instruction, always ask your healthcare professional. Travon Maryellen any warranty or liability for your use of this information. DISCHARGE SUMMARY from Nurse    PATIENT INSTRUCTIONS:    After general anesthesia or intravenous sedation, for 24 hours or while taking prescription Narcotics:  · Limit your activities  · Do not drive and operate hazardous machinery  · Do not make important personal or business decisions  · Do  not drink alcoholic beverages  · If you have not urinated within 8 hours after discharge, please contact your surgeon on call.     Report the following to your surgeon:  · Excessive pain, swelling, redness or odor of or around the surgical area  · Temperature over 100.5  · Nausea and vomiting lasting longer than 4 hours or if unable to take medications  · Any signs of decreased circulation or nerve impairment to extremity: change in color, persistent  numbness, tingling, coldness or increase pain  · Any questions    What to do at Home:  Recommended activity: Activity as tolerated, per MD instructions    If you experience any of the following symptoms fever > 100.5, nausea, vomiting, pain, chest pain and/or shortness of breath please follow up with MD.    *  Please give a list of your current medications to your Primary Care Provider. *  Please update this list whenever your medications are discontinued, doses are      changed, or new medications (including over-the-counter products) are added. *  Please carry medication information at all times in case of emergency situations. These are general instructions for a healthy lifestyle:    No smoking/ No tobacco products/ Avoid exposure to second hand smoke  Surgeon General's Warning:  Quitting smoking now greatly reduces serious risk to your health. Obesity, smoking, and sedentary lifestyle greatly increases your risk for illness    A healthy diet, regular physical exercise & weight monitoring are important for maintaining a healthy lifestyle    You may be retaining fluid if you have a history of heart failure or if you experience any of the following symptoms:  Weight gain of 3 pounds or more overnight or 5 pounds in a week, increased swelling in our hands or feet or shortness of breath while lying flat in bed. Please call your doctor as soon as you notice any of these symptoms; do not wait until your next office visit. Recognize signs and symptoms of STROKE:    F-face looks uneven    A-arms unable to move or move unevenly    S-speech slurred or non-existent    T-time-call 911 as soon as signs and symptoms begin-DO NOT go       Back to bed or wait to see if you get better-TIME IS BRAIN. Warning Signs of HEART ATTACK     Call 911 if you have these symptoms:   Chest discomfort. Most heart attacks involve discomfort in the center of the chest that lasts more than a few minutes, or that goes away and comes back. It can feel like uncomfortable pressure, squeezing, fullness, or pain.    Discomfort in other areas of the upper body. Symptoms can include pain or discomfort in one or both arms, the back, neck, jaw, or stomach.  Shortness of breath with or without chest discomfort.  Other signs may include breaking out in a cold sweat, nausea, or lightheadedness. Don't wait more than five minutes to call 911 - MINUTES MATTER! Fast action can save your life. Calling 911 is almost always the fastest way to get lifesaving treatment. Emergency Medical Services staff can begin treatment when they arrive -- up to an hour sooner than if someone gets to the hospital by car. The discharge information has been reviewed with the patient. The patient verbalized understanding. Discharge medications reviewed with the patient and appropriate educational materials and side effects teaching were provided.   ___________________________________________________________________________________________________________________________________

## 2018-03-09 NOTE — PROGRESS NOTES
STG: Pt will tolerate regular textures/thin liquids without overt signs/sx of aspiration with 100% accuracy  STG: Pt will complete oral motor exercises x10 with 90% accuracy  STG: Pt will participate with full speech/language assessment x1  LTG: Pt will tolerate the least restrictive diet at discharge without respiratory compromise    Speech language pathology: bedside swallow note: Daily Note 1    NAME/AGE/GENDER: Yenifer Jeronimo is a 68 y.o. male  DATE: 3/9/2018  PRIMARY DIAGNOSIS: CVA (cerebral vascular accident) [facility])       ICD-10: Treatment Diagnosis: dysphagia; oropharyngeal R13.12  INTERDISCIPLINARY COLLABORATION: Registered Nurse  PRECAUTIONS/ALLERGIES: Review of patient's allergies indicates no known allergies. ASSESSMENT:Patient seen for diet tolerance and oral motor exercises. Patient tolerated peanut butter crackers and water via the straw without difficulty. Completed oral motor exercises outlined below 1x10 with 80-90% accuracy. A written copy of exercises was provided for independent practice over the weekend. Recently assessed for [facility]. Recommend regular diet/thin liquids. Will follow for oral motor exercises and full cognitive-linguistic assessment. Patient will benefit from skilled intervention to address the below impairments. ?????? ? ? This section established at most recent assessment??????????  PROBLEM LIST (Impairments causing functional limitations):  1. Dysphagia  2. Cognition? REHABILITATION POTENTIAL FOR STATED GOALS: Good  PLAN OF CARE:   Patient will benefit from skilled intervention to address the following impairments.   RECOMMENDATIONS AND PLANNED INTERVENTIONS (Benefits and precautions of therapy have been discussed with the patient.):  · PO:  Regular  · Liquids:  regular thin  MEDICATIONS:  · With liquid  COMPENSATORY STRATEGIES/MODIFICATIONS INCLUDING:  · Small sips and bites  OTHER RECOMMENDATIONS (including follow up treatment recommendations):   · Oral motor exercises  · Family training/education  · Patient education  · cognitive assessment  RECOMMENDED DIET MODIFICATIONS DISCUSSED WITH:  · Nursing  · Patient  FREQUENCY/DURATION: Continue to follow patient 3 times a week for duration of hospital stay to address above goals. RECOMMENDED REHABILITATION/EQUIPMENT: (at time of discharge pending progress): Due to the probability of continued deficits (see above) this patient will likely need continued skilled speech therapy after discharge. SUBJECTIVE:   Pleasant and cooperative. History of Present Injury/Illness: Mr. Adan Jorgensen  has a past medical history of Chronic obstructive pulmonary disease ([address] and Hypertension. Shashi Vogel He also  has no past surgical history on file. Present Symptoms: left sided weakness  Pain Intensity 1: 0  Current Medications:   No current facility-administered medications on file prior to encounter. Current Outpatient Prescriptions on File Prior to Encounter   Medication Sig Dispense Refill    pravastatin (PRAVACHOL) 40 mg tablet Take 1 Tab by mouth nightly. 30 Tab 0    aspirin 81 mg chewable tablet Take 1 Tab by mouth daily. 30 Tab 0    diltiazem CD (CARDIZEM CD) 120 mg ER capsule Take 1 Cap by mouth daily. 30 Cap 0     Current Dietary Status:  NPO pending consult       History of reflux:  no  Social History/Home Situation: home with his son     OBJECTIVE:   Respiratory Status:  Nasal cannula  2 l/min  CXR Results:No acute cardiopulmonary process evident on single frontal view of the  chest.  MRI/CT Results: CT negative; MRI pending  Oral Motor Structure/Speech:  Oral-Motor Structure/Motor Speech  Labial: Decreased rate, Left droop  Dentition: Natural, Limited  Oral Hygiene: adequate  Lingual: Left deviation (mild)       OTHER OBSERVATIONS:  Rate/bite size: WNL   Endurance:   WNL       Tool Used: Dysphagia Outcome and Severity Scale (JING)    Score Comments   Normal Diet  [] 7 With no strategies or extra time needed   Functional Swallow  [x] 6 May have mild oral or pharyngeal delay       Mild Dysphagia    [] 5 Which may require one diet consistency restricted (those who demonstrate penetration which is entirely cleared on MBS would be included)   Mild-Moderate Dysphagia  [] 4 With 1-2 diet consistencies restricted       Moderate Dysphagia  [] 3 With 2 or more diet consistencies restricted       Moderately Severe Dysphagia  [] 2 With partial PO strategies (trials with ST only)       Severe Dysphagia  [] 1 With inability to tolerate any PO safely          Score:  Initial: 6 Most Recent: X (Date: -- )   Interpretation of Tool: The Dysphagia Outcome and Severity Scale (JING) is a simple, easy-to-use, 7-point scale developed to systematically rate the functional severity of dysphagia based on objective assessment and make recommendations for diet level, independence level, and type of nutrition. Score 7 6 5 4 3 2 1   Modifier CH CI CJ CK CL CM CN   ? Swallowing:     - CURRENT STATUS: CI - 1%-19% impaired, limited or restricted    - GOAL STATUS:  CI - 1%-19% impaired, limited or restricted    - D/C STATUS:  CI - 1%-19% impaired, limited or restricted  Payor: CARE IMPROVEMENT PLUS / [place] / Product Type: [employer] Care Medicare /     TREATMENT:    (In addition to Assessment/Re-Assessment sessions the following treatments were rendered)  Dysphagia Activities: Activities/Procedures listed utilized to improve progress in diet tolerance and swallow safety. Required no cueing to decrease aspiration risk. MODALITIES:                                                                    ORAL MOTOR  EXERCISES:  Exaggerate Vowels: Yes  Reps: 10  Sets : 1           Labial Closure:  Yes  Reps : 10  Sets: 1           Labial Pucker: Yes  Reps: 10  Sets : 1                                                                                                    \"OO-EE\": Yes  Reps: 10  Sets : 1              LARYNGEAL / PHARYNGEAL EXERCISES: __________________________________________________________________________________________________  Safety:   After treatment position/precautions:  · RN notified  · Upright in Bed  Progression/Medical Necessity:   · Skilled intervention continues to be required due to unable to attend/participate in therapy as expected. Compliance with Program/Exercises: Will assess as treatment progresses. Reason for Continuation of Services/Other Comments:  · Patient continues to require skilled intervention due to patient unable to attend/participate in therapy as expected. Recommendations/Intent for next treatment session: \"Treatment next visit will focus on diet tolerance; oral motor exercises; cognitive assessment\".     Total Treatment Duration:  Time In: 0946  Time Out: [place] MS, CCC-SLP

## 2018-03-10 ENCOUNTER — HOME HEALTH ADMISSION (OUTPATIENT)
Dept: HOME HEALTH SERVICES | Facility: HOME HEALTH | Age: 78
End: 2018-03-10
Payer: MEDICARE

## 2018-03-10 VITALS
OXYGEN SATURATION: 96 % | TEMPERATURE: 98 F | WEIGHT: 104 LBS | SYSTOLIC BLOOD PRESSURE: 131 MMHG | BODY MASS INDEX: 15.81 KG/M2 | HEART RATE: 76 BPM | DIASTOLIC BLOOD PRESSURE: 78 MMHG | RESPIRATION RATE: 18 BRPM

## 2018-03-10 PROBLEM — I47.1 PAROXYSMAL ATRIAL TACHYCARDIA (HCC): Status: ACTIVE | Noted: 2018-03-10

## 2018-03-10 PROCEDURE — 74011000250 HC RX REV CODE- 250: Performed by: HOSPITALIST

## 2018-03-10 PROCEDURE — 74011250637 HC RX REV CODE- 250/637: Performed by: HOSPITALIST

## 2018-03-10 PROCEDURE — 94640 AIRWAY INHALATION TREATMENT: CPT

## 2018-03-10 PROCEDURE — 97530 THERAPEUTIC ACTIVITIES: CPT

## 2018-03-10 PROCEDURE — 97165 OT EVAL LOW COMPLEX 30 MIN: CPT

## 2018-03-10 PROCEDURE — 74011250637 HC RX REV CODE- 250/637: Performed by: FAMILY MEDICINE

## 2018-03-10 RX ORDER — FLUTICASONE PROPIONATE AND SALMETEROL 250; 50 UG/1; UG/1
1 POWDER RESPIRATORY (INHALATION) 2 TIMES DAILY
Qty: 60 EACH | Refills: 0 | Status: SHIPPED
Start: 2018-03-10

## 2018-03-10 RX ADMIN — BUDESONIDE 500 MCG: 0.5 INHALANT RESPIRATORY (INHALATION) at 08:56

## 2018-03-10 RX ADMIN — CLOPIDOGREL BISULFATE 75 MG: 75 TABLET ORAL at 08:44

## 2018-03-10 RX ADMIN — TIOTROPIUM BROMIDE 18 MCG: 18 CAPSULE ORAL; RESPIRATORY (INHALATION) at 08:56

## 2018-03-10 RX ADMIN — ARFORMOTEROL TARTRATE 15 MCG: 15 SOLUTION RESPIRATORY (INHALATION) at 08:56

## 2018-03-10 RX ADMIN — PANTOPRAZOLE SODIUM 40 MG: 40 TABLET, DELAYED RELEASE ORAL at 06:27

## 2018-03-10 RX ADMIN — Medication 5 ML: at 06:25

## 2018-03-10 RX ADMIN — DILTIAZEM HYDROCHLORIDE 120 MG: 120 CAPSULE, COATED, EXTENDED RELEASE ORAL at 08:44

## 2018-03-10 NOTE — DISCHARGE SUMMARY
Patient ID:  Yimi Mt  [id number]  19 y.o.  1940  Admit date: 3/8/2018 11:56 AM  Discharge date and time: 3/10/2018  Attending: Vinita Dawson MD  PCP:  Arleth Jeffrey MD  Treatment Team: Attending Provider: Vinita Dawson MD; Consulting Provider: Myranda Aquino MD; Care Manager: Jhoana Stinson RN; Utilization Review: Anh Smith RN; Charge Nurse: Cami Nicholas; Unit Clerk: Daniella Borjas RN    Principal Diagnosis CVA (cerebral vascular accident) [facility])   Principal Problem:    CVA (cerebral vascular accident) ([address] (3/8/2018)    Active Problems:    Carotid stenosis, bilateral (6/21/2016)      Essential hypertension (3/8/2018)      Pure hypercholesterolemia (3/8/2018)      Pulmonary emphysema ([address] (3/8/2018)      Aspiration pneumonitis ([address] (3/9/2018)      Paroxysmal atrial tachycardia ([address] (3/10/2018)             Hospital Course:  Please refer to the admission H&P for details of presentation. In summary, Mr Rashid Redd is a 68year old gentleman with history of COPD on inhalers, HTN, Rowan Ran, who was relatively well till this morning when he went to go to the bathroom noted to be dragging left foot. There is noted to be slight slurred speech and difficulty swallowing He also noted to have weakness of left upper extremity. He went to bed normal with no acute issues. He has never had these symptoms previously. No history of Afib or arhythmia. He denies any nausea no vomiting. Slight left sided headache. No fever chills no ill contacts. Mr Rashid Redd did well while in hospital he was noted to have episodes of atrial tach vs sinus tach on monitor that was very self limiting. He did have evidence of an acute embolic frontal lobe infarct on MRI. His symptoms did improve with strength moving from 2/3 up to 4+ power on left side. He was seen by PT OT and SLP.  Given his improvement, we have decided to discharge him home with home health    Given patient embolic nature of his CVA, and episodes of tachycardia, we have opted to anticoagulate him with eliquis, and refer him to his PCP/cardiologist Dr Carly Barry who may consider a loop recorder for him. Mr Cj Tucker is stable and agreeable for discharge home today. Significant Diagnostic Studies:       Labs: Results:       Chemistry Recent Labs      03/08/18   1218   GLU  105*   NA  139   K  4.7   CL  103   CO2  32   BUN  19   CREA  1.38   CA  9.2   AGAP  4*      CBC w/Diff Recent Labs      03/09/18   0645  03/08/18   1218   WBC  7.2  6.9   RBC  4.17*  4.82   HGB  11.7*  13.4*   HCT  36.2*  41.5   PLT  233  242   GRANS   --   75   LYMPH   --   15   EOS   --   2      Cardiac Enzymes No results for input(s): CPK, CKND1, JAN in the last 72 hours. No lab exists for component: CKRMB, TROIP   Coagulation Recent Labs      03/09/18   0645  03/08/18   1210   PTP  13.6   --    INR  1.1  1.0   APTT  33.7   --        Lipid Panel Lab Results   Component Value Date/Time    Cholesterol, total 133 03/09/2018 06:45 AM    HDL Cholesterol 34 (L) 03/09/2018 06:45 AM    LDL, calculated 83 03/09/2018 06:45 AM    VLDL, calculated 16 03/09/2018 06:45 AM    Triglyceride 80 03/09/2018 06:45 AM    CHOL/HDL Ratio 3.9 03/09/2018 06:45 AM      BNP No results for input(s): BNPP in the last 72 hours. Liver Enzymes No results for input(s): TP, ALB, TBIL, AP, SGOT, GPT in the last 72 hours. No lab exists for component: DBIL   Thyroid Studies Lab Results   Component Value Date/Time    TSH 1.070 03/08/2018 12:18 PM          Results for orders placed or performed during the hospital encounter of 03/08/18   EKG, 12 LEAD, INITIAL   Result Value Ref Range    Ventricular Rate 78 BPM    Atrial Rate 78 BPM    P-R Interval 134 ms    QRS Duration 84 ms    Q-T Interval 360 ms    QTC Calculation (Bezet) 410 ms    Calculated P Axis 74 degrees    Calculated R Axis 85 degrees    Calculated T Axis 81 degrees    Diagnosis       !! AGE AND GENDER SPECIFIC ECG ANALYSIS !!   Normal sinus rhythm  Septal infarct , age undetermined  T wave abnormality, consider anterior ischemia  Abnormal ECG  When compared with ECG of 20-JUN-2016 16:38,  Premature supraventricular complexes are no longer Present  T wave inversion now evident in Anterior leads  Confirmed by BRADLEY EDOUARD (), Maria Eugenia Kaiser ([id number]) on 3/8/2018 2:06:06 PM       CT Results (most recent):    [facility]   CTA HEAD NECK W WO CONT   Narrative History: Code stroke. Left sided weakness    Comments: CT ANGIOGRAM OF THE NECK AND CT ANGIOGRAM OF THE [place] was  obtained following the administration of IV contrast. IV contrast was  administered to evaluate the arterial vasculature. Reformatted images in the  coronal and sagittal planes as well as 3-D imaging was obtained and reviewed on  a dedicated PACS and [address]. Radiation reduction dose techniques  were used for the study. Our CT scanner use one or all of the following-  Automated exposure control, adjustment of the mA and/or KV according the patient  size, iterative reconstruction. Findings:    CT ANGIOGRAM OF THE NECK:    Please note there is motion artifact on this exam which does degrade the study  to interpret. The arch and proximal great vessels demonstrate generalized atherosclerotic  changes. 45-50% narrowing is noted at the origin left subclavian artery. Motion artifact is fairly significant at the level of the carotid bulbs. The  right carotid bulb demonstrates eccentric calcified plaque disease with a  proximally 50% narrowing. The left carotid bulb demonstrates narrowing that  extends into the origin of the left internal carotid artery. Degree of stenosis  is 65%. These are somewhat approximated due to motion artifact. The vertebral  arteries are patent. All measurements are based upon NASCET criteria. Severe emphysematous changes are noted in the lung apices. The thyroid gland is grossly unremarkable.     CT ANGIOGRAM OF [place]:    The petrous, cavernous, and supraclinoid internal carotid arteries are patent  with generalized atherosclerotic changes. 50% narrowing is noted in the  cavernous portion of the left internal carotid artery. The anterior and middle  cerebral arteries are patent bilaterally. The distal vertebral arteries, basilar artery, posterior cerebral arteries are  patent bilaterally. No evidence of aneurysm and/or vascular malformation. The orbits and paranasal sinuses are grossly unremarkable with the exception of  very mild disease in the right maxillary sinus. The ventricles are symmetric in size and configuration. The sulci are intact. Impression IMPRESSION:  1. Please note the examination is limited due to motion artifact. The most  significant vascular pathology is at the level of the left carotid bulb and  origin of the left internal carotid artery with a exceed 65% stenosis  2. Significant emphysematous changes in the lung apices      VAS/US Results (most recent):    Results from Hospital Encounter encounter on 06/20/16   DUPLEX CAROTID BILATERAL   Narrative History: Syncope. Sonographic evaluation of the carotid arteries was performed bilaterally    Grayscale imaging on the right demonstrates mild plaque disease at the carotid  bulb. The velocities and ratios are unremarkable. The right vertebral artery  demonstrates antegrade flow without evidence of steal.    Grayscale imaging on the left demonstrates to significant plaque disease at the  carotid bulb. The velocity within the internal carotid artery is markedly  elevated at 411 cm/s systolic and 95 cm/s diastolic. External carotid artery  velocities are also elevated. The ratio of ICA to CCA is elevated at 4.7. The  left vertebral artery demonstrates antegrade flow without evidence of steal.         Impression Impression:  1. Less than 50% stenosis of the right internal carotid artery.   2. 70-99% stenosis of the left internal carotid artery. XR Results (most recent):    Results from Hospital Encounter encounter on 03/08/18   XR CHEST SNGL V   Narrative PORTABLE CHEST X-RAY    HISTORY: Low oxygen saturation    COMPARISON: 3/8/2018    FINDINGS: EKG leads are present. Interstitial densities persist in the middle  lung zones. These may be sequela of recent infection or areas of scarring. There  is no new consolidation. Impression IMPRESSION: Interstitial densities in the middle lung zones. No new  consolidation. Discharge Exam:  Visit Vitals    /73 (BP 1 Location: Right arm, BP Patient Position: At rest)    Pulse 89    Temp 98.3 °F (36.8 °C)    Resp 17    Wt 47.2 kg (104 lb)    SpO2 97%    BMI 15.81 kg/m2     General appearance: alert, cooperative, no distress, appears stated age  Lungs: clear to auscultation bilaterally  Heart: regular rate and rhythm, S1, S2 normal, no murmur, click, rub or gallop  Abdomen: soft, non-tender. Bowel sounds normal. No masses,  no organomegaly  Extremities:  4+/5 power left upper and lower extremity  Neurologic: Grossly normal    Disposition: home  Discharge Condition: stable  Patient Instructions:   Current Discharge Medication List      START taking these medications    Details   apixaban (ELIQUIS) 5 mg tablet Take 1 Tab by mouth two (2) times a day for 30 days. Qty: 60 Tab, Refills: 0      tiotropium (SPIRIVA) 18 mcg inhalation capsule Take 1 Cap by inhalation daily. Qty: 30 Cap, Refills: 0      fluticasone-salmeterol (ADVAIR) 250-50 mcg/dose diskus inhaler Take 1 Puff by inhalation two (2) times a day. Qty: 60 Each, Refills: 0         CONTINUE these medications which have NOT CHANGED    Details   omeprazole (PRILOSEC) 40 mg capsule Take 40 mg by mouth daily. ferrous sulfate 325 mg (65 mg iron) tablet Take 325 mg by mouth daily. pravastatin (PRAVACHOL) 40 mg tablet Take 1 Tab by mouth nightly.   Qty: 30 Tab, Refills: 0      diltiazem CD (CARDIZEM CD) 120 mg ER capsule Take 1 Cap by mouth daily.   Qty: 30 Cap, Refills: 0         STOP taking these medications       aspirin 81 mg chewable tablet Comments:   Reason for Stopping:               Activity: Activity as tolerated and PT/OT per Home Health  Diet: Cardiac Diet  Wound Care: None needed    Follow-up  ·   Dr Meaghan Cornelius 3 days  Time spent to discharge patient greater 50 minutes  Signed:  Lucy Willett MD  3/10/2018  10:33 AM

## 2018-03-10 NOTE — PROGRESS NOTES
Problem: Mobility Impaired (Adult and Pediatric)  Goal: *Acute Goals and Plan of Care (Insert Text)  Discharge Goals:  (1.)Mr. Dawna House will perform bed mobility with INDEPENDENCE within 7 treatment day(s). (2.)Mr. Dawna House will transfer with MODIFIED INDEPENDNECE using the least restrictive device within 7 treatment day(s). (3.)Mr. Dawna House will ambulate with MODIFIED INDEPENDENCE for 150+ feet with the least restrictive device within 7 treatment day(s). (4.)Mr. Dawna House will demonstrate good dynamic standing balance utilizing least restrictive assistive device within 7 treatment day(s). (5.)Mr. Dawna House will tolerate 25+ minutes of therapeutic activity/exercise and/or neuromuscular re-education while maintaining stable vitals to improve functional strength and mobility within 3 day(s). ________________________________________________________________________________________________      PHYSICAL THERAPY: Daily Note, Treatment Day: 1st, AM 3/10/2018  INPATIENT: Hospital Day: 3  Payor: CARE IMPROVEMENT PLUS [place] / Product Type: [facility] Care Medicare /      NAME/AGE/GENDER: José Miguel Velez is a 68 y.o. male   PRIMARY DIAGNOSIS: CVA (cerebral vascular accident) ([address] CVA (cerebral vascular accident) [address] CVA (cerebral vascular accident) ([facility]        ICD-10: Treatment Diagnosis:   · Difficulty in walking, Not elsewhere classified (R26.2)  · Hemiplegia and hemiparesis following cerebral infarction affecting left non-dominant side (Z95.902)   Precaution/Allergies:  Review of patient's allergies indicates no known allergies. ASSESSMENT:     Mr. Dawna House was sitting in chair at arrival just finishing with OT. Initially he said he needed a RW. Ambulated 100ft with RW, pt was MOD Ind. Then ambulated 100ft with no AD and with SUP. He showed no signs of LOB even when turning head to talk to people.  After returning to room discussed RW at home and Pt improving with gait  Not using AD and stable with balance challenges. This section established at most recent assessment   PROBLEM LIST (Impairments causing functional limitations):  1. Decreased Strength  2. Decreased ADL/Functional Activities  3. Decreased Transfer Abilities  4. Decreased Ambulation Ability/Technique  5. Decreased Balance  6. Decreased Activity Tolerance  7. Decreased Flexibility/Joint Mobility  8. Decreased Knowledge of Precautions  9. Decreased [place] with Home Exercise Program   INTERVENTIONS PLANNED: (Benefits and precautions of physical therapy have been discussed with the patient.)  1. Balance Exercise  2. Bed Mobility  3. Family Education  4. Gait Training  5. Neuromuscular Re-education/Strengthening  6. Range of Motion (ROM)  7. Therapeutic Activites  8. Therapeutic Exercise/Strengthening  9. Transfer Training  10. Group Therapy     TREATMENT PLAN: Frequency/Duration: 3 times a week for duration of hospital stay  Rehabilitation Potential For Stated Goals: Good     RECOMMENDED REHABILITATION/EQUIPMENT: (at time of discharge pending progress): Due to the probability of continued deficits (see above) this patient will likely need continued skilled physical therapy after discharge. Equipment:    TBD pending patient progress              HISTORY:   History of Present Injury/Illness (Reason for Referral):  CVA  Past Medical History/Comorbidities:   Mr. Kyle Bacon  has a past medical history of Chronic obstructive pulmonary disease ([facility] and Hypertension. Mr. Kyle Bacon  has no past surgical history on file.   Social History/Living Environment:   Home Environment: Private residence  # Steps to Enter: 2  Rails to Enter: Yes  One/Two Story Residence: One story  Living Alone: No  Support Systems: Child(bernardo)  Patient Expects to be Discharged to[de-identified] Private residence  Current DME Used/Available at Home: Sachi Koo, straight  Tub or Shower Type: Tub/Shower combination  Prior Level of Function/Work/Activity:  Patient lives with his son in a single story residence with 4 steps to enter. At baseline, patient is independent with ADLs, ambulates community level distances without utilizing an assistive device, and drives. Number of Personal Factors/Comorbidities that affect the Plan of Care: 1-2: MODERATE COMPLEXITY   EXAMINATION:   Most Recent Physical Functioning:   Gross Assessment:                  Posture:     Balance:  Sitting: Intact  Standing: Impaired  Standing - Static: Good  Standing - Dynamic : Fair Bed Mobility:     Wheelchair Mobility:     Transfers:  Sit to Stand: Supervision  Stand to Sit: Supervision  Gait:     Base of Support: Narrowed  Gait Abnormalities: Shuffling gait  Distance (ft): 150 Feet (ft)  Assistive Device:  (none)  Ambulation - Level of Assistance: Stand-by assistance      Body Structures Involved:  1. Nerves  2. Muscles Body Functions Affected:  1. Neuromusculoskeletal  2. Movement Related Activities and Participation Affected:  1. Mobility  2. Self Care   Number of elements that affect the Plan of Care: 4+: HIGH COMPLEXITY   CLINICAL PRESENTATION:   Presentation: Evolving clinical presentation with changing clinical characteristics: MODERATE COMPLEXITY   CLINICAL DECISION MAKIN [facility] Mobility Inpatient Short Form  How much difficulty does the patient currently have. .. Unable A Lot A Little None   1. Turning over in bed (including adjusting bedclothes, sheets and blankets)? [] 1   [] 2   [] 3   [x] 4   2. Sitting down on and standing up from a chair with arms ( e.g., wheelchair, bedside commode, etc.)   [] 1   [] 2   [x] 3   [] 4   3. Moving from lying on back to sitting on the side of the bed? [] 1   [] 2   [x] 3   [] 4   How much help from another person does the patient currently need. .. Total A Lot A Little None   4. Moving to and from a bed to a chair (including a wheelchair)? [] 1   [] 2   [x] 3   [] 4   5. Need to walk in hospital room?    [] 1   [x] 2   [] 3 [] 4   6. Climbing 3-5 steps with a railing? [] 1   [x] 2   [] 3   [] 4   © 2007, Trustees of [address] [place] [address] [address] [id number], under license to [employer]. All rights reserved      Score:  Initial: 17 Most Recent: 17 (Date: 3/9/18 )    Interpretation of Tool:  Represents activities that are increasingly more difficult (i.e. Bed mobility, Transfers, Gait). Score 24 23 22-20 19-15 14-10 9-7 6     Modifier CH CI CJ CK CL CM CN      ? Mobility - Walking and Moving Around:     - CURRENT STATUS: CK - 40%-59% impaired, limited or restricted    - GOAL STATUS: CJ - 20%-39% impaired, limited or restricted    - D/C STATUS:  ---------------To be determined---------------  Payor: CARE IMPROVEMENT PLUS [place] / Product Type: [employer] Care Medicare /      Medical Necessity:     · Patient demonstrates good rehab potential due to higher previous functional level. Reason for Services/Other Comments:  · Patient continues to require skilled intervention due to decreased functional mobility and balance/gait status from baseline. .   Use of outcome tool(s) and clinical judgement create a POC that gives a: Questionable prediction of patient's progress: MODERATE COMPLEXITY            TREATMENT:   (In addition to Assessment/Re-Assessment sessions the following treatments were rendered)   Pre-treatment Symptoms/Complaints:    Pain: Initial:   Pain Intensity 1: 0  Post Session:  0/10     Therapeutic Activity: (    10min):  Therapeutic activities including Chair transfers and Ambulation on level ground to improve mobility, strength, balance and coordination. Required minimal   to promote static and dynamic balance in standing, promote coordination of bilateral, lower extremity(s) and promote motor control of bilateral, lower extremity(s). Braces/Orthotics/Lines/Etc:   · IV  · O2 Device: Nasal cannula  Treatment/Session Assessment:    · Response to Treatment:  See above.   · Interdisciplinary Collaboration:   o Physical Therapist  o Registered Nurse  · After treatment position/precautions:   o Up in chair  o Bed alarm/tab alert on  o Call light within reach  o RN notified   · Compliance with Program/Exercises: Will assess as treatment progresses. · Recommendations/Intent for next treatment session: \"Next visit will focus on advancements to more challenging activities and reduction in assistance provided\".   Total Treatment Duration:  PT Patient Time In/Time Out  Time In: 1010  Time Out: [address], DPT

## 2018-03-10 NOTE — PROGRESS NOTES
Discharge instructions and prescriptions given and reviewed with pt, verbalizes understanding, pt discharged home with family. Patient waiting for son to come with clothes for discharge. Patient given coupons for [employer] [place] for home.

## 2018-03-10 NOTE — PROGRESS NOTES
Problem: Self Care Deficits Care Plan (Adult)  Goal: *Acute Goals and Plan of Care (Insert Text)  No goals set as pt appears to be functioning near baseline with ADLs. Comments:     OCCUPATIONAL THERAPY: Initial Assessment, Discharge and AM 3/10/2018  INPATIENT: Hospital Day: 3  Payor: CARE IMPROVEMENT PLUS [place] / Product Type: Managed Care Medicare /      NAME/AGE/GENDER: Ciro Villanueva is a 68 y.o. male   PRIMARY DIAGNOSIS:  CVA (cerebral vascular accident) ([address] CVA (cerebral vascular accident) ([address] CVA (cerebral vascular accident) ([address]        ICD-10: Treatment Diagnosis:    · Generalized Muscle Weakness (M62.81)  · Other lack of cordination (R27.8)   Precautions/Allergies:     Review of patient's allergies indicates no known allergies. ASSESSMENT:     Mr. Beau Koo presents to hospital for above. Pt lives with son, who can assist as needed, at baseline in one-level home where he is typically independent to mod I with ADLs/IADLs, including driving. He does not use any AD/DME at baseline for functional mobility, but has access to a rolling walker. Today, he is found seated in recliner upon arrival, AOX4, and agreeable to OT evaluation. Pt is [place], of note. Per BUE screen, BUE strength, coordination, and AROM are WFL and symmetrical. Pt completes STS, functional transfer to tub-shower/toilet, and functional mobility within room with supervision-independence, no AD used. Pt seen and examined by PT yesterday, he appears to be functioning much better today than PT initial evaluation reveals. Based on today's evaluation, he is functioning near his baseline with ADLs and will not need continued skilled OT services. Will D/C OT and defer to PT as needed. Thanks for allowing OT to participate in this patient's care.      This section established at most recent assessment   PROBLEM LIST (Impairments causing functional limitations):  1. n/a   INTERVENTIONS PLANNED: (Benefits and precautions of occupational therapy have been discussed with the patient.)  1. N/a       TREATMENT PLAN: Frequency/Duration: Follow patient n/a to address above goals. Rehabilitation Potential For Stated Goals: n/a     RECOMMENDED REHABILITATION/EQUIPMENT: (at time of discharge pending progress): Due to the probability of continued deficits (see above) this patient will not likely need continued skilled occupational therapy after discharge. Equipment:    None at this time              OCCUPATIONAL PROFILE AND HISTORY:   History of Present Injury/Illness (Reason for Referral):  See H&P  Past Medical History/Comorbidities:   Mr. Bari Perera  has a past medical history of Chronic obstructive pulmonary disease ([facility] [address] and Hypertension. Mr. Bari Perera  has no past surgical history on file. Social History/Living Environment:   Home Environment: Private residence  # Steps to Enter: 2  Rails to Enter: Yes  One/Two Story Residence: One story  Living Alone: No  Support Systems: Child(bernardo)  Patient Expects to be Discharged to[de-identified] Private residence  Current DME Used/Available at Home: Soumya Radish, straight  Tub or Shower Type: Tub/Shower combination  Prior Level of Function/Work/Activity:  [place] to mod I with ADLs  Personal Factors:          Sex:  male        Age:  68 y.o.    Number of Personal Factors/Comorbidities that affect the Plan of Care: Expanded review of therapy/medical records (1-2):  MODERATE COMPLEXITY   ASSESSMENT OF OCCUPATIONAL PERFORMANCE[de-identified]   Activities of Daily Living:           Basic ADLs (From Assessment) Complex ADLs (From Assessment)   Basic ADL  Feeding: Independent  Oral Facial Hygiene/Grooming: Independent  Bathing: Supervision  Upper Body Dressing: Independent  Lower Body Dressing: Independent  Toileting: Independent     Grooming/Bathing/Dressing Activities of Daily Living     Cognitive Retraining  Safety/Judgement: Awareness of environment;Driving appropriateness                 Functional Transfers  Toilet Transfer : Supervision  Tub Transfer: Supervision     Bed/Mat Mobility  Sit to Stand: Supervision       Most Recent Physical Functioning:   Gross Assessment:  AROM: Within functional limits  Strength: Within functional limits  Coordination: Within functional limits  Sensation: Intact               Posture:  Posture (WDL): Exceptions to WDL  Posture Assessment: Forward head, Rounded shoulders  Balance:  Sitting: Intact  Standing: Impaired; Without support  Standing - Static: Good  Standing - Dynamic : Fair Bed Mobility:     Wheelchair Mobility:     Transfers:  Sit to Stand: Supervision  Stand to Sit: Supervision                Patient Vitals for the past 6 hrs:   BP BP Patient Position SpO2 O2 Flow Rate (L/min) Pulse   03/10/18 0800 125/73 At rest 96 % - 89   03/10/18 0856 - - 97 % 0 l/min -       Mental Status  Neurologic State: Alert  Orientation Level: Oriented X4  Cognition: Follows commands, Appropriate for age attention/concentration  Perception: Appears intact  Perseveration: No perseveration noted  Safety/Judgement: Awareness of environment, Driving appropriateness                          Physical Skills Involved:  1. n/a Cognitive Skills Affected (resulting in the inability to perform in a timely and safe manner):  1. n/a Psychosocial Skills Affected:  1. n/a   Number of elements that affect the Plan of Care: 1-3:  LOW COMPLEXITY   CLINICAL DECISION MAKIN [facility] [address] [address] AM-PAC 6 Clicks   Daily Activity Inpatient Short Form  How much help from another person does the patient currently need. .. Total A Lot A Little None   1. Putting on and taking off regular lower body clothing? [] 1   [] 2   [] 3   [x] 4   2. Bathing (including washing, rinsing, drying)? [] 1   [] 2   [x] 3   [] 4   3. Toileting, which includes using toilet, bedpan or urinal?   [] 1   [] 2   [] 3   [x] 4   4. Putting on and taking off regular upper body clothing? [] 1   [] 2   [] 3   [x] 4   5.   Taking care of personal grooming such as brushing teeth? [] 1   [] 2   [] 3   [x] 4   6. Eating meals? [] 1   [] 2   [] 3   [x] 4   © 2007, Trustees of [address] [place] [address] [address] [address], under license to [email or web address]. All rights reserved      Score:  Initial: 23 Most Recent: X (Date: -- )    Interpretation of Tool:  Represents activities that are increasingly more difficult (i.e. Bed mobility, Transfers, Gait). Score 24 23 22-20 19-15 14-10 9-7 6     Modifier CH CI CJ CK CL CM CN      ? Self Care:     - CURRENT STATUS: CI - 1%-19% impaired, limited or restricted    - GOAL STATUS: CI - 1%-19% impaired, limited or restricted    - D/C STATUS:  CI - 1%-19% impaired, limited or restricted  Payor: CARE IMPROVEMENT PLUS [place] / Product Type: Managed Care Medicare /      Medical Necessity:     · n/a. Reason for Services/Other Comments:  · n/a. Use of outcome tool(s) and clinical judgement create a POC that gives a: LOW COMPLEXITY         TREATMENT:   (In addition to Assessment/Re-Assessment sessions the following treatments were rendered)     Pre-treatment Symptoms/Complaints:    Pain: Initial:   Pain Intensity 1: 0  Post Session:  same     Assessment/Reassessment only, no treatment provided today    Braces/Orthotics/Lines/Etc:   · O2 Device: Room air  Treatment/Session Assessment:    · Response to Treatment:  eval only   · Interdisciplinary Collaboration:   o Physical Therapist  o Occupational Therapist  o Registered Nurse  · After treatment position/precautions:   o with PT   · Compliance with Program/Exercises: compliant all of the time. · Recommendations/Intent for next treatment session: \"Next visit will focus on n/a\".   Total Treatment Duration:  OT Patient Time In/Time Out  Time In: 1000  Time Out: [address] [facility] [place], [address]

## 2018-03-10 NOTE — PROGRESS NOTES
Notified by tele pt HR elevated to 155 and returned to NSR. Assessed pt, pt is asymptomatic, manual pulse is 84. Pt had O2 off, stating it is uncomfortable to wear. Replaced O2, will continue to monitor.

## 2018-03-10 NOTE — PROGRESS NOTES
Care Management Interventions  PCP Verified by CM: Yes  Mode of Transport at Discharge:  Other (see comment) (family)  Transition of Care Consult (CM Consult): Discharge Planning, [address]: Yes  Physical Therapy Consult: Yes  Occupational Therapy Consult: Yes  Speech Therapy Consult: Yes  Current Support Network: Own Home  Confirm Follow Up Transport: Self  Plan discussed with Pt/Family/Caregiver: Yes  Freedom of Choice Offered: Yes  Discharge Location  Discharge Placement: Home with home health (Home with [facility])

## 2018-03-10 NOTE — PROGRESS NOTES
[address]  Face to Face Encounter    Patients Name: Sylvia Bowie    YOB: 1940    Ordering Physician: Sharri Meneses MD    Primary Diagnosis: CVA (cerebral vascular accident) [facility])    Date of Face to Face:   3/10/2018                                  Face to Face Encounter findings are related to primary reason for home care:   yes. 1. I certify that the patient needs intermittent care as follows: skilled nursing care:  skilled observation/assessment, patient education  physical therapy: strengthening  occupational therapy:  ADL safety (ie. cooking, bathing, dressing)    2. I certify that this patient is homebound, that is: 1) patient requires the use of a cane device, special transportation, or assistance of another to leave the home; or 2) patient's condition makes leaving the home medically contraindicated; and 3) patient has a normal inability to leave the home and leaving the home requires considerable and taxing effort. Patient may leave the home for infrequent and short duration for medical reasons, and occasional absences for non-medical reasons. Homebound status is due to the following functional limitations: Patient with strength deficits limiting the performance of all ADL's without caregiver assistance or the use of an assistive device. 3. I certify that this patient is under my care and that I, or a nurse practitioner or  [id number], or clinical nurse specialist, or certified nurse midwife, working with me, had a Face-to-Face Encounter that meets the physician Face-to-Face Encounter requirements.   The following are the clinical findings from the [address] [place] [address] encounter that support the need for skilled services and is a summary of the encounter: see hospital medical record    See discharge summary      Tosha Rios LMSW  3/10/2018      THE FOLLOWING TO BE COMPLETED BY THE COMMUNITY PHYSICIAN:    I concur with the findings described above from the F2F encounter that this patient is homebound and in need of a skilled service.     Certifying Physician: _____________________________________      Printed Certifying Physician Name: _____________________________________    Date: _________________

## 2018-03-11 ENCOUNTER — HOME CARE VISIT (OUTPATIENT)
Dept: SCHEDULING | Facility: HOME HEALTH | Age: 78
End: 2018-03-11
Payer: MEDICARE

## 2018-03-11 VITALS
HEART RATE: 98 BPM | SYSTOLIC BLOOD PRESSURE: 120 MMHG | OXYGEN SATURATION: 92 % | RESPIRATION RATE: 16 BRPM | DIASTOLIC BLOOD PRESSURE: 70 MMHG | TEMPERATURE: 97 F

## 2018-03-11 PROCEDURE — G0299 HHS/HOSPICE OF RN EA 15 MIN: HCPCS

## 2018-03-11 PROCEDURE — 400013 HH SOC

## 2018-03-13 ENCOUNTER — HOME CARE VISIT (OUTPATIENT)
Dept: SCHEDULING | Facility: HOME HEALTH | Age: 78
End: 2018-03-13
Payer: MEDICARE

## 2018-03-13 VITALS
TEMPERATURE: 98.4 F | HEART RATE: 90 BPM | SYSTOLIC BLOOD PRESSURE: 118 MMHG | RESPIRATION RATE: 18 BRPM | DIASTOLIC BLOOD PRESSURE: 62 MMHG

## 2018-03-13 PROCEDURE — G0151 HHCP-SERV OF PT,EA 15 MIN: HCPCS

## 2018-03-14 ENCOUNTER — HOME CARE VISIT (OUTPATIENT)
Dept: SCHEDULING | Facility: HOME HEALTH | Age: 78
End: 2018-03-14
Payer: MEDICARE

## 2018-03-14 VITALS
DIASTOLIC BLOOD PRESSURE: 80 MMHG | SYSTOLIC BLOOD PRESSURE: 118 MMHG | OXYGEN SATURATION: 95 % | TEMPERATURE: 97.8 F | RESPIRATION RATE: 18 BRPM | HEART RATE: 68 BPM

## 2018-03-14 VITALS
OXYGEN SATURATION: 95 % | TEMPERATURE: 97.8 F | HEART RATE: 68 BPM | RESPIRATION RATE: 18 BRPM | SYSTOLIC BLOOD PRESSURE: 118 MMHG | DIASTOLIC BLOOD PRESSURE: 80 MMHG

## 2018-03-14 PROCEDURE — G0152 HHCP-SERV OF OT,EA 15 MIN: HCPCS

## 2018-03-15 ENCOUNTER — HOME CARE VISIT (OUTPATIENT)
Dept: HOME HEALTH SERVICES | Facility: HOME HEALTH | Age: 78
End: 2018-03-15
Payer: MEDICARE